# Patient Record
Sex: FEMALE | Race: WHITE | Employment: FULL TIME | URBAN - METROPOLITAN AREA
[De-identification: names, ages, dates, MRNs, and addresses within clinical notes are randomized per-mention and may not be internally consistent; named-entity substitution may affect disease eponyms.]

---

## 2017-05-22 ENCOUNTER — HOSPITAL ENCOUNTER (OUTPATIENT)
Dept: PAIN MANAGEMENT | Age: 32
Discharge: HOME | End: 2017-05-22
Payer: MEDICARE

## 2017-05-22 VITALS
BODY MASS INDEX: 37.56 KG/M2 | HEIGHT: 64 IN | HEART RATE: 83 BPM | TEMPERATURE: 98.5 F | WEIGHT: 220 LBS | RESPIRATION RATE: 16 BRPM | DIASTOLIC BLOOD PRESSURE: 79 MMHG | SYSTOLIC BLOOD PRESSURE: 117 MMHG

## 2017-05-22 DIAGNOSIS — M96.1 POSTLAMINECTOMY SYNDROME, UNSPECIFIED REGION: Chronic | ICD-10-CM

## 2017-05-22 DIAGNOSIS — M54.16 LUMBAR RADICULOPATHY: Chronic | ICD-10-CM

## 2017-05-22 PROCEDURE — 80307 DRUG TEST PRSMV CHEM ANLYZR: CPT

## 2017-05-22 PROCEDURE — 99204 OFFICE O/P NEW MOD 45 MIN: CPT

## 2017-05-22 RX ORDER — OXYCODONE HYDROCHLORIDE AND ACETAMINOPHEN 5; 325 MG/1; MG/1
1 TABLET ORAL 3 TIMES DAILY PRN
COMMUNITY
End: 2017-06-20

## 2017-05-22 RX ORDER — GABAPENTIN 600 MG/1
600 TABLET ORAL 3 TIMES DAILY
Status: ON HOLD | COMMUNITY
End: 2018-01-16 | Stop reason: HOSPADM

## 2017-05-22 RX ORDER — METHOCARBAMOL 750 MG/1
750 TABLET, FILM COATED ORAL 4 TIMES DAILY
COMMUNITY
End: 2017-07-18

## 2017-05-22 RX ORDER — DULOXETIN HYDROCHLORIDE 30 MG/1
30 CAPSULE, DELAYED RELEASE ORAL DAILY
Status: ON HOLD | COMMUNITY
End: 2018-01-16 | Stop reason: HOSPADM

## 2017-05-22 RX ORDER — OXYCODONE HYDROCHLORIDE 5 MG/1
5 TABLET ORAL EVERY 8 HOURS PRN
COMMUNITY
End: 2017-05-22 | Stop reason: SDUPTHER

## 2017-05-22 RX ORDER — IBUPROFEN 200 MG
200 TABLET ORAL EVERY 6 HOURS PRN
Status: ON HOLD | COMMUNITY
End: 2018-01-16 | Stop reason: HOSPADM

## 2017-05-22 RX ORDER — OXYCODONE HYDROCHLORIDE 5 MG/1
5 TABLET ORAL EVERY 8 HOURS PRN
Qty: 42 TABLET | Refills: 0 | Status: SHIPPED | OUTPATIENT
Start: 2017-06-06 | End: 2017-06-20 | Stop reason: SDUPTHER

## 2017-05-22 ASSESSMENT — PAIN DESCRIPTION - ORIENTATION: ORIENTATION: RIGHT

## 2017-05-22 ASSESSMENT — PAIN DESCRIPTION - DESCRIPTORS: DESCRIPTORS: CONSTANT;ACHING;STABBING;SHARP

## 2017-05-22 ASSESSMENT — PAIN DESCRIPTION - LOCATION: LOCATION: BACK;LEG;HIP

## 2017-05-22 ASSESSMENT — PAIN DESCRIPTION - PAIN TYPE: TYPE: CHRONIC PAIN

## 2017-05-22 ASSESSMENT — PAIN DESCRIPTION - FREQUENCY: FREQUENCY: CONTINUOUS

## 2017-05-22 ASSESSMENT — PAIN DESCRIPTION - ONSET: ONSET: ON-GOING

## 2017-05-22 ASSESSMENT — PAIN SCALES - GENERAL: PAINLEVEL_OUTOF10: 9

## 2017-05-22 ASSESSMENT — PAIN DESCRIPTION - PROGRESSION: CLINICAL_PROGRESSION: NOT CHANGED

## 2017-05-24 LAB
6-ACETYLMORPHINE, UR: NOT DETECTED
7-AMINOCLONAZEPAM, URINE: NOT DETECTED
ALPHA-OH-ALPRAZ, URINE: NOT DETECTED
ALPRAZOLAM, URINE: NOT DETECTED
AMPHETAMINES, URINE: NOT DETECTED
BARBITURATES, URINE: NOT DETECTED
BENZOYLECGONINE, UR: NOT DETECTED
BUPRENORPHINE URINE: NOT DETECTED
CARISOPRODOL, UR: NOT DETECTED
CLONAZEPAM, URINE: NOT DETECTED
CODEINE, URINE: NOT DETECTED
CREATININE URINE: 250.1 MG/DL (ref 20–400)
DIAZEPAM, URINE: PRESENT
EER PAIN MGT DRUG PANEL, HIGH RES/EMIT U: NORMAL
ETHYL GLUCURONIDE UR: NOT DETECTED
FENTANYL URINE: NOT DETECTED
HYDROCODONE, URINE: NOT DETECTED
HYDROMORPHONE, URINE: NOT DETECTED
LORAZEPAM, URINE: NOT DETECTED
MARIJUANA METAB, UR: NOT DETECTED
MDA, UR: NOT DETECTED
MDEA, EVE, UR: NOT DETECTED
MDMA URINE: NOT DETECTED
MEPERIDINE METAB, UR: NOT DETECTED
METHADONE, URINE: NOT DETECTED
METHAMPHETAMINE, URINE: NOT DETECTED
METHYLPHENIDATE: NOT DETECTED
MIDAZOLAM, URINE: NOT DETECTED
MORPHINE URINE: NOT DETECTED
NORBUPRENORPHINE, URINE: NOT DETECTED
NORDIAZEPAM, URINE: NOT DETECTED
NORFENTANYL, URINE: NOT DETECTED
NORHYDROCODONE, URINE: NOT DETECTED
NOROXYCODONE, URINE: NOT DETECTED
NOROXYMORPHONE, URINE: NOT DETECTED
OXAZEPAM, URINE: NOT DETECTED
OXYCODONE URINE: PRESENT
OXYMORPHONE, URINE: NOT DETECTED
PAIN MGT DRUG PANEL, HI RES, UR: NORMAL
PCP,URINE: NOT DETECTED
PHENTERMINE, UR: NOT DETECTED
PROPOXYPHENE, URINE: NOT DETECTED
TAPENTADOL, URINE: NOT DETECTED
TAPENTADOL-O-SULFATE, URINE: NOT DETECTED
TEMAZEPAM, URINE: NOT DETECTED
TRAMADOL, URINE: NOT DETECTED
ZOLPIDEM, URINE: NOT DETECTED

## 2017-06-20 ENCOUNTER — HOSPITAL ENCOUNTER (OUTPATIENT)
Dept: PAIN MANAGEMENT | Age: 32
Discharge: HOME | End: 2017-06-20
Payer: MEDICARE

## 2017-06-20 VITALS
HEART RATE: 83 BPM | DIASTOLIC BLOOD PRESSURE: 58 MMHG | RESPIRATION RATE: 12 BRPM | SYSTOLIC BLOOD PRESSURE: 135 MMHG | TEMPERATURE: 98.6 F

## 2017-06-20 DIAGNOSIS — M54.16 LUMBAR RADICULOPATHY: Primary | Chronic | ICD-10-CM

## 2017-06-20 DIAGNOSIS — M96.1 POSTLAMINECTOMY SYNDROME, UNSPECIFIED REGION: Chronic | ICD-10-CM

## 2017-06-20 PROCEDURE — 99214 OFFICE O/P EST MOD 30 MIN: CPT

## 2017-06-20 PROCEDURE — 99213 OFFICE O/P EST LOW 20 MIN: CPT

## 2017-06-20 RX ORDER — OXYCODONE HYDROCHLORIDE 5 MG/1
5 TABLET ORAL EVERY 8 HOURS PRN
Qty: 90 TABLET | Refills: 0 | Status: SHIPPED | OUTPATIENT
Start: 2017-06-20 | End: 2017-07-18 | Stop reason: SDUPTHER

## 2017-06-20 ASSESSMENT — PAIN DESCRIPTION - FREQUENCY: FREQUENCY: CONTINUOUS

## 2017-06-20 ASSESSMENT — PAIN DESCRIPTION - PROGRESSION: CLINICAL_PROGRESSION: NOT CHANGED

## 2017-06-20 ASSESSMENT — PAIN SCALES - GENERAL: PAINLEVEL_OUTOF10: 7

## 2017-06-20 ASSESSMENT — PAIN DESCRIPTION - LOCATION: LOCATION: BACK;LEG;HIP

## 2017-06-20 ASSESSMENT — ENCOUNTER SYMPTOMS
CONSTIPATION: 0
COUGH: 0
BACK PAIN: 1
SHORTNESS OF BREATH: 0

## 2017-06-20 ASSESSMENT — PAIN DESCRIPTION - PAIN TYPE: TYPE: CHRONIC PAIN

## 2017-06-20 ASSESSMENT — PAIN DESCRIPTION - ORIENTATION: ORIENTATION: MID;LOWER

## 2017-07-18 ENCOUNTER — HOSPITAL ENCOUNTER (OUTPATIENT)
Dept: PAIN MANAGEMENT | Age: 32
Discharge: HOME | End: 2017-07-18
Payer: MEDICARE

## 2017-07-18 VITALS — SYSTOLIC BLOOD PRESSURE: 129 MMHG | HEART RATE: 109 BPM | TEMPERATURE: 98.4 F | DIASTOLIC BLOOD PRESSURE: 91 MMHG

## 2017-07-18 DIAGNOSIS — M96.1 POSTLAMINECTOMY SYNDROME, UNSPECIFIED REGION: Primary | Chronic | ICD-10-CM

## 2017-07-18 DIAGNOSIS — M54.16 LUMBAR RADICULOPATHY: Chronic | ICD-10-CM

## 2017-07-18 PROCEDURE — 80307 DRUG TEST PRSMV CHEM ANLYZR: CPT

## 2017-07-18 PROCEDURE — 99215 OFFICE O/P EST HI 40 MIN: CPT

## 2017-07-18 RX ORDER — OXYCODONE HYDROCHLORIDE 5 MG/1
5 TABLET ORAL EVERY 8 HOURS PRN
Qty: 90 TABLET | Refills: 0 | Status: SHIPPED | OUTPATIENT
Start: 2017-07-20 | End: 2017-08-19

## 2017-07-18 ASSESSMENT — PAIN DESCRIPTION - FREQUENCY: FREQUENCY: CONTINUOUS

## 2017-07-18 ASSESSMENT — PAIN DESCRIPTION - ONSET: ONSET: ON-GOING

## 2017-07-18 ASSESSMENT — PAIN SCALES - GENERAL: PAINLEVEL_OUTOF10: 8

## 2017-07-18 ASSESSMENT — PAIN DESCRIPTION - PAIN TYPE: TYPE: CHRONIC PAIN

## 2017-07-18 ASSESSMENT — PAIN DESCRIPTION - DESCRIPTORS: DESCRIPTORS: ACHING;BURNING;CONSTANT

## 2017-07-18 ASSESSMENT — PAIN DESCRIPTION - PROGRESSION: CLINICAL_PROGRESSION: GRADUALLY WORSENING

## 2017-07-18 ASSESSMENT — PAIN DESCRIPTION - LOCATION: LOCATION: BACK;HIP;LEG

## 2017-07-18 ASSESSMENT — PAIN DESCRIPTION - ORIENTATION: ORIENTATION: LOWER;RIGHT;LEFT

## 2017-07-19 ASSESSMENT — ENCOUNTER SYMPTOMS
CONSTIPATION: 0
BACK PAIN: 1
COUGH: 0
SHORTNESS OF BREATH: 0
BOWEL INCONTINENCE: 0

## 2017-07-21 LAB

## 2018-01-14 ENCOUNTER — APPOINTMENT (OUTPATIENT)
Dept: GENERAL RADIOLOGY | Age: 33
DRG: 812 | End: 2018-01-14
Payer: MEDICARE

## 2018-01-14 ENCOUNTER — HOSPITAL ENCOUNTER (INPATIENT)
Age: 33
LOS: 2 days | Discharge: PSYCHIATRIC OR SUBSTANCE USE FACILITY | DRG: 812 | End: 2018-01-16
Attending: EMERGENCY MEDICINE | Admitting: INTERNAL MEDICINE
Payer: MEDICARE

## 2018-01-14 ENCOUNTER — APPOINTMENT (OUTPATIENT)
Dept: CT IMAGING | Age: 33
DRG: 812 | End: 2018-01-14
Payer: MEDICARE

## 2018-01-14 DIAGNOSIS — T50.902A INTENTIONAL DRUG OVERDOSE, INITIAL ENCOUNTER (HCC): Primary | ICD-10-CM

## 2018-01-14 DIAGNOSIS — T14.91XA SUICIDE ATTEMPT (HCC): ICD-10-CM

## 2018-01-14 PROBLEM — T50.912A SUICIDE ATTEMPT BY MULTIPLE DRUG OVERDOSE (HCC): Status: ACTIVE | Noted: 2018-01-14

## 2018-01-14 PROBLEM — J96.01 ACUTE RESPIRATORY FAILURE WITH HYPOXIA (HCC): Status: ACTIVE | Noted: 2018-01-14

## 2018-01-14 LAB
-: ABNORMAL
ABSOLUTE EOS #: 0 K/UL (ref 0–0.4)
ABSOLUTE IMMATURE GRANULOCYTE: ABNORMAL K/UL (ref 0–0.3)
ABSOLUTE LYMPH #: 1.3 K/UL (ref 1–4.8)
ABSOLUTE MONO #: 0.5 K/UL (ref 0.1–1.3)
ACETAMINOPHEN LEVEL: <10 UG/ML (ref 10–30)
ACETAMINOPHEN LEVEL: <10 UG/ML (ref 10–30)
ALBUMIN SERPL-MCNC: 4.2 G/DL (ref 3.5–5.2)
ALBUMIN/GLOBULIN RATIO: ABNORMAL (ref 1–2.5)
ALLEN TEST: ABNORMAL
ALLEN TEST: ABNORMAL
ALP BLD-CCNC: 77 U/L (ref 35–104)
ALT SERPL-CCNC: 13 U/L (ref 5–33)
AMORPHOUS: ABNORMAL
AMPHETAMINE SCREEN URINE: NEGATIVE
ANION GAP SERPL CALCULATED.3IONS-SCNC: 17 MMOL/L (ref 9–17)
AST SERPL-CCNC: 18 U/L
BACTERIA: ABNORMAL
BARBITURATE SCREEN URINE: NEGATIVE
BASOPHILS # BLD: 1 % (ref 0–2)
BASOPHILS ABSOLUTE: 0.1 K/UL (ref 0–0.2)
BENZODIAZEPINE SCREEN, URINE: NEGATIVE
BILIRUB SERPL-MCNC: <0.15 MG/DL (ref 0.3–1.2)
BILIRUBIN URINE: NEGATIVE
BUN BLDV-MCNC: 12 MG/DL (ref 6–20)
BUN/CREAT BLD: ABNORMAL (ref 9–20)
BUPRENORPHINE URINE: ABNORMAL
CALCIUM SERPL-MCNC: 8.4 MG/DL (ref 8.6–10.4)
CANNABINOID SCREEN URINE: POSITIVE
CARBOXYHEMOGLOBIN: 1.7 % (ref 0–5)
CARBOXYHEMOGLOBIN: 3 % (ref 0–5)
CASTS UA: ABNORMAL /LPF
CHLORIDE BLD-SCNC: 104 MMOL/L (ref 98–107)
CO2: 21 MMOL/L (ref 20–31)
COCAINE METABOLITE, URINE: POSITIVE
COLOR: YELLOW
COMMENT UA: ABNORMAL
CREAT SERPL-MCNC: 0.66 MG/DL (ref 0.5–0.9)
CRYSTALS, UA: ABNORMAL /HPF
DIFFERENTIAL TYPE: ABNORMAL
EKG ATRIAL RATE: 113 BPM
EKG P AXIS: 76 DEGREES
EKG P-R INTERVAL: 142 MS
EKG Q-T INTERVAL: 348 MS
EKG QRS DURATION: 92 MS
EKG QTC CALCULATION (BAZETT): 477 MS
EKG R AXIS: 49 DEGREES
EKG T AXIS: 56 DEGREES
EKG VENTRICULAR RATE: 113 BPM
EOSINOPHILS RELATIVE PERCENT: 0 % (ref 0–4)
EPITHELIAL CELLS UA: ABNORMAL /HPF
ETHANOL PERCENT: <0.01 %
ETHANOL: <10 MG/DL
FIO2: ABNORMAL
FIO2: ABNORMAL
GFR AFRICAN AMERICAN: >60 ML/MIN
GFR NON-AFRICAN AMERICAN: >60 ML/MIN
GFR SERPL CREATININE-BSD FRML MDRD: ABNORMAL ML/MIN/{1.73_M2}
GFR SERPL CREATININE-BSD FRML MDRD: ABNORMAL ML/MIN/{1.73_M2}
GLUCOSE BLD-MCNC: 143 MG/DL (ref 70–99)
GLUCOSE URINE: NEGATIVE
HCG(URINE) PREGNANCY TEST: NEGATIVE
HCO3 ARTERIAL: 22.7 MMOL/L (ref 22–26)
HCO3 VENOUS: 23 MMOL/L (ref 24–30)
HCT VFR BLD CALC: 42.7 % (ref 36–46)
HEMOGLOBIN: 14.3 G/DL (ref 12–16)
IMMATURE GRANULOCYTES: ABNORMAL %
KETONES, URINE: NEGATIVE
LACTIC ACID: 1.8 MMOL/L (ref 0.5–2.2)
LACTIC ACID: 3.4 MMOL/L (ref 0.5–2.2)
LEUKOCYTE ESTERASE, URINE: NEGATIVE
LIPASE: 17 U/L (ref 13–60)
LYMPHOCYTES # BLD: 12 % (ref 24–44)
MCH RBC QN AUTO: 31.4 PG (ref 26–34)
MCHC RBC AUTO-ENTMCNC: 33.4 G/DL (ref 31–37)
MCV RBC AUTO: 94 FL (ref 80–100)
MDMA URINE: ABNORMAL
METHADONE SCREEN, URINE: NEGATIVE
METHAMPHETAMINE, URINE: ABNORMAL
METHEMOGLOBIN: 0.4 % (ref 0–1.9)
METHEMOGLOBIN: 0.6 % (ref 0–1.9)
MODE: ABNORMAL
MODE: ABNORMAL
MONOCYTES # BLD: 4 % (ref 1–7)
MUCUS: ABNORMAL
NEGATIVE BASE EXCESS, ART: 2.6 MMOL/L (ref 0–2)
NEGATIVE BASE EXCESS, VEN: 3 MMOL/L (ref 0–2)
NITRITE, URINE: NEGATIVE
NOTIFICATION TIME: ABNORMAL
NOTIFICATION TIME: ABNORMAL
NOTIFICATION: ABNORMAL
NOTIFICATION: ABNORMAL
O2 DEVICE/FLOW/%: ABNORMAL
O2 DEVICE/FLOW/%: ABNORMAL
O2 SAT, ARTERIAL: 97.8 % (ref 95–98)
O2 SAT, VEN: 85.1 % (ref 60–85)
OPIATES, URINE: POSITIVE
OTHER OBSERVATIONS UA: ABNORMAL
OXYCODONE SCREEN URINE: NEGATIVE
OXYHEMOGLOBIN: ABNORMAL % (ref 95–98)
OXYHEMOGLOBIN: ABNORMAL % (ref 95–98)
PATIENT TEMP: 37
PATIENT TEMP: 37
PCO2 ARTERIAL: 39.4 MMHG (ref 35–45)
PCO2, ART, TEMP ADJ: ABNORMAL (ref 35–45)
PCO2, VEN, TEMP ADJ: ABNORMAL MMHG (ref 39–55)
PCO2, VEN: 44 (ref 39–55)
PDW BLD-RTO: 13.3 % (ref 11.5–14.9)
PEEP/CPAP: 5
PEEP/CPAP: ABNORMAL
PH ARTERIAL: 7.37 (ref 7.35–7.45)
PH UA: 6 (ref 5–8)
PH VENOUS: 7.33 (ref 7.32–7.42)
PH, ART, TEMP ADJ: ABNORMAL (ref 7.35–7.45)
PH, VEN, TEMP ADJ: ABNORMAL (ref 7.32–7.42)
PHENCYCLIDINE, URINE: NEGATIVE
PLATELET # BLD: 277 K/UL (ref 150–450)
PLATELET ESTIMATE: ABNORMAL
PMV BLD AUTO: 8.3 FL (ref 6–12)
PO2 ARTERIAL: 201 MMHG (ref 80–100)
PO2, ART, TEMP ADJ: ABNORMAL MMHG (ref 80–100)
PO2, VEN, TEMP ADJ: ABNORMAL MMHG (ref 30–50)
PO2, VEN: 54.6 (ref 30–50)
POSITIVE BASE EXCESS, ART: ABNORMAL MMOL/L (ref 0–2)
POSITIVE BASE EXCESS, VEN: ABNORMAL MMOL/L (ref 0–2)
POTASSIUM SERPL-SCNC: 3.7 MMOL/L (ref 3.7–5.3)
PROPOXYPHENE, URINE: ABNORMAL
PROTEIN UA: NEGATIVE
PSV: ABNORMAL
PSV: ABNORMAL
PT. POSITION: ABNORMAL
PT. POSITION: ABNORMAL
RBC # BLD: 4.54 M/UL (ref 4–5.2)
RBC # BLD: ABNORMAL 10*6/UL
RBC UA: ABNORMAL /HPF
RENAL EPITHELIAL, UA: ABNORMAL /HPF
RESPIRATORY RATE: 16
RESPIRATORY RATE: ABNORMAL
SALICYLATE LEVEL: <1 MG/DL (ref 3–10)
SALICYLATE LEVEL: <1 MG/DL (ref 3–10)
SAMPLE SITE: ABNORMAL
SAMPLE SITE: ABNORMAL
SEG NEUTROPHILS: 83 % (ref 36–66)
SEGMENTED NEUTROPHILS ABSOLUTE COUNT: 9.4 K/UL (ref 1.3–9.1)
SET RATE: 16
SET RATE: ABNORMAL
SODIUM BLD-SCNC: 142 MMOL/L (ref 135–144)
SPECIFIC GRAVITY UA: 1.02 (ref 1–1.03)
TEST INFORMATION: ABNORMAL
TEXT FOR RESPIRATORY: ABNORMAL
TEXT FOR RESPIRATORY: ABNORMAL
TOTAL CK: 85 U/L (ref 26–192)
TOTAL HB: ABNORMAL G/DL (ref 12–16)
TOTAL HB: ABNORMAL G/DL (ref 12–16)
TOTAL PROTEIN: 7.4 G/DL (ref 6.4–8.3)
TOTAL RATE: 23
TOTAL RATE: ABNORMAL
TRICHOMONAS: ABNORMAL
TRICYCLIC ANTIDEPRESSANTS, UR: ABNORMAL
TROPONIN INTERP: NORMAL
TROPONIN T: <0.03 NG/ML
TURBIDITY: CLEAR
URINE HGB: ABNORMAL
UROBILINOGEN, URINE: NORMAL
VT: 450
VT: ABNORMAL
WBC # BLD: 11.3 K/UL (ref 3.5–11)
WBC # BLD: ABNORMAL 10*3/UL
WBC UA: ABNORMAL /HPF
YEAST: ABNORMAL

## 2018-01-14 PROCEDURE — 94770 HC ETCO2 MONITOR DAILY: CPT

## 2018-01-14 PROCEDURE — 51703 INSERT BLADDER CATH COMPLEX: CPT

## 2018-01-14 PROCEDURE — 82800 BLOOD PH: CPT

## 2018-01-14 PROCEDURE — 36415 COLL VENOUS BLD VENIPUNCTURE: CPT

## 2018-01-14 PROCEDURE — 84703 CHORIONIC GONADOTROPIN ASSAY: CPT

## 2018-01-14 PROCEDURE — 2580000003 HC RX 258: Performed by: INTERNAL MEDICINE

## 2018-01-14 PROCEDURE — 80307 DRUG TEST PRSMV CHEM ANLYZR: CPT

## 2018-01-14 PROCEDURE — 0BH17EZ INSERTION OF ENDOTRACHEAL AIRWAY INTO TRACHEA, VIA NATURAL OR ARTIFICIAL OPENING: ICD-10-PCS | Performed by: INTERNAL MEDICINE

## 2018-01-14 PROCEDURE — 5A1935Z RESPIRATORY VENTILATION, LESS THAN 24 CONSECUTIVE HOURS: ICD-10-PCS | Performed by: INTERNAL MEDICINE

## 2018-01-14 PROCEDURE — 80053 COMPREHEN METABOLIC PANEL: CPT

## 2018-01-14 PROCEDURE — 94762 N-INVAS EAR/PLS OXIMTRY CONT: CPT

## 2018-01-14 PROCEDURE — 82550 ASSAY OF CK (CPK): CPT

## 2018-01-14 PROCEDURE — 83605 ASSAY OF LACTIC ACID: CPT

## 2018-01-14 PROCEDURE — 31500 INSERT EMERGENCY AIRWAY: CPT

## 2018-01-14 PROCEDURE — 6360000002 HC RX W HCPCS

## 2018-01-14 PROCEDURE — 2580000003 HC RX 258: Performed by: FAMILY MEDICINE

## 2018-01-14 PROCEDURE — 94002 VENT MGMT INPAT INIT DAY: CPT

## 2018-01-14 PROCEDURE — 2000000000 HC ICU R&B

## 2018-01-14 PROCEDURE — 6360000002 HC RX W HCPCS: Performed by: INTERNAL MEDICINE

## 2018-01-14 PROCEDURE — 82805 BLOOD GASES W/O2 SATURATION: CPT

## 2018-01-14 PROCEDURE — 36600 WITHDRAWAL OF ARTERIAL BLOOD: CPT

## 2018-01-14 PROCEDURE — 83690 ASSAY OF LIPASE: CPT

## 2018-01-14 PROCEDURE — 43753 TX GASTRO INTUB W/ASP: CPT

## 2018-01-14 PROCEDURE — 99285 EMERGENCY DEPT VISIT HI MDM: CPT

## 2018-01-14 PROCEDURE — 71045 X-RAY EXAM CHEST 1 VIEW: CPT

## 2018-01-14 PROCEDURE — C9113 INJ PANTOPRAZOLE SODIUM, VIA: HCPCS | Performed by: INTERNAL MEDICINE

## 2018-01-14 PROCEDURE — 81001 URINALYSIS AUTO W/SCOPE: CPT

## 2018-01-14 PROCEDURE — 2580000003 HC RX 258: Performed by: EMERGENCY MEDICINE

## 2018-01-14 PROCEDURE — 6360000002 HC RX W HCPCS: Performed by: EMERGENCY MEDICINE

## 2018-01-14 PROCEDURE — 99291 CRITICAL CARE FIRST HOUR: CPT | Performed by: INTERNAL MEDICINE

## 2018-01-14 PROCEDURE — 70450 CT HEAD/BRAIN W/O DYE: CPT

## 2018-01-14 PROCEDURE — 2500000003 HC RX 250 WO HCPCS: Performed by: EMERGENCY MEDICINE

## 2018-01-14 PROCEDURE — 85025 COMPLETE CBC W/AUTO DIFF WBC: CPT

## 2018-01-14 PROCEDURE — G0480 DRUG TEST DEF 1-7 CLASSES: HCPCS

## 2018-01-14 PROCEDURE — 2500000003 HC RX 250 WO HCPCS

## 2018-01-14 PROCEDURE — 84484 ASSAY OF TROPONIN QUANT: CPT

## 2018-01-14 PROCEDURE — 6370000000 HC RX 637 (ALT 250 FOR IP): Performed by: INTERNAL MEDICINE

## 2018-01-14 RX ORDER — ETOMIDATE 2 MG/ML
INJECTION INTRAVENOUS DAILY PRN
Status: COMPLETED | OUTPATIENT
Start: 2018-01-14 | End: 2018-01-14

## 2018-01-14 RX ORDER — 0.9 % SODIUM CHLORIDE 0.9 %
1000 INTRAVENOUS SOLUTION INTRAVENOUS ONCE
Status: COMPLETED | OUTPATIENT
Start: 2018-01-14 | End: 2018-01-14

## 2018-01-14 RX ORDER — DEXTROSE AND SODIUM CHLORIDE 5; .45 G/100ML; G/100ML
INJECTION, SOLUTION INTRAVENOUS CONTINUOUS
Status: DISCONTINUED | OUTPATIENT
Start: 2018-01-14 | End: 2018-01-15

## 2018-01-14 RX ORDER — PROPOFOL 10 MG/ML
INJECTION, EMULSION INTRAVENOUS CONTINUOUS PRN
Status: COMPLETED | OUTPATIENT
Start: 2018-01-14 | End: 2018-01-14

## 2018-01-14 RX ORDER — CYCLOBENZAPRINE HCL 10 MG
10 TABLET ORAL
COMMUNITY
End: 2020-04-01

## 2018-01-14 RX ORDER — MIDAZOLAM HYDROCHLORIDE 1 MG/ML
2 INJECTION INTRAMUSCULAR; INTRAVENOUS
Status: DISCONTINUED | OUTPATIENT
Start: 2018-01-14 | End: 2018-01-15

## 2018-01-14 RX ORDER — PANTOPRAZOLE SODIUM 40 MG/10ML
40 INJECTION, POWDER, LYOPHILIZED, FOR SOLUTION INTRAVENOUS DAILY
Status: DISCONTINUED | OUTPATIENT
Start: 2018-01-14 | End: 2018-01-15

## 2018-01-14 RX ORDER — LORAZEPAM 2 MG/ML
2 INJECTION INTRAMUSCULAR ONCE
Status: COMPLETED | OUTPATIENT
Start: 2018-01-14 | End: 2018-01-14

## 2018-01-14 RX ORDER — ACETAMINOPHEN 325 MG/1
650 TABLET ORAL EVERY 4 HOURS PRN
Status: DISCONTINUED | OUTPATIENT
Start: 2018-01-14 | End: 2018-01-16 | Stop reason: HOSPADM

## 2018-01-14 RX ORDER — NICOTINE 21 MG/24HR
1 PATCH, TRANSDERMAL 24 HOURS TRANSDERMAL DAILY
Status: DISCONTINUED | OUTPATIENT
Start: 2018-01-14 | End: 2018-01-16 | Stop reason: HOSPADM

## 2018-01-14 RX ORDER — 0.9 % SODIUM CHLORIDE 0.9 %
10 VIAL (ML) INJECTION DAILY
Status: DISCONTINUED | OUTPATIENT
Start: 2018-01-14 | End: 2018-01-15

## 2018-01-14 RX ORDER — CHLORHEXIDINE GLUCONATE 0.12 MG/ML
15 RINSE ORAL 2 TIMES DAILY
Status: DISCONTINUED | OUTPATIENT
Start: 2018-01-14 | End: 2018-01-15

## 2018-01-14 RX ORDER — MIDAZOLAM HYDROCHLORIDE 1 MG/ML
1 INJECTION INTRAMUSCULAR; INTRAVENOUS
Status: DISCONTINUED | OUTPATIENT
Start: 2018-01-14 | End: 2018-01-15

## 2018-01-14 RX ORDER — SODIUM CHLORIDE 0.9 % (FLUSH) 0.9 %
10 SYRINGE (ML) INJECTION EVERY 12 HOURS SCHEDULED
Status: DISCONTINUED | OUTPATIENT
Start: 2018-01-14 | End: 2018-01-16 | Stop reason: HOSPADM

## 2018-01-14 RX ORDER — PROPOFOL 10 MG/ML
20 INJECTION, EMULSION INTRAVENOUS ONCE
Status: COMPLETED | OUTPATIENT
Start: 2018-01-14 | End: 2018-01-14

## 2018-01-14 RX ORDER — SODIUM CHLORIDE 9 MG/ML
INJECTION, SOLUTION INTRAVENOUS CONTINUOUS
Status: DISCONTINUED | OUTPATIENT
Start: 2018-01-14 | End: 2018-01-15

## 2018-01-14 RX ORDER — SUCCINYLCHOLINE CHLORIDE 20 MG/ML
INJECTION INTRAMUSCULAR; INTRAVENOUS DAILY PRN
Status: COMPLETED | OUTPATIENT
Start: 2018-01-14 | End: 2018-01-14

## 2018-01-14 RX ORDER — SODIUM CHLORIDE 0.9 % (FLUSH) 0.9 %
10 SYRINGE (ML) INJECTION PRN
Status: DISCONTINUED | OUTPATIENT
Start: 2018-01-14 | End: 2018-01-16 | Stop reason: HOSPADM

## 2018-01-14 RX ORDER — PROPOFOL 10 MG/ML
10 INJECTION, EMULSION INTRAVENOUS CONTINUOUS
Status: DISCONTINUED | OUTPATIENT
Start: 2018-01-14 | End: 2018-01-15

## 2018-01-14 RX ORDER — ALBUTEROL SULFATE 90 UG/1
4 AEROSOL, METERED RESPIRATORY (INHALATION) EVERY 6 HOURS PRN
Status: DISCONTINUED | OUTPATIENT
Start: 2018-01-14 | End: 2018-01-16 | Stop reason: HOSPADM

## 2018-01-14 RX ORDER — FENTANYL CITRATE 50 UG/ML
25 INJECTION, SOLUTION INTRAMUSCULAR; INTRAVENOUS
Status: DISCONTINUED | OUTPATIENT
Start: 2018-01-14 | End: 2018-01-15

## 2018-01-14 RX ADMIN — MIDAZOLAM 2 MG: 1 INJECTION INTRAMUSCULAR; INTRAVENOUS at 22:09

## 2018-01-14 RX ADMIN — LORAZEPAM 2 MG: 2 INJECTION INTRAMUSCULAR; INTRAVENOUS at 05:13

## 2018-01-14 RX ADMIN — SODIUM CHLORIDE: 9 INJECTION, SOLUTION INTRAVENOUS at 12:49

## 2018-01-14 RX ADMIN — PROPOFOL 50 MCG/KG/MIN: 10 INJECTION, EMULSION INTRAVENOUS at 06:18

## 2018-01-14 RX ADMIN — DEXTROSE AND SODIUM CHLORIDE: 5; 450 INJECTION, SOLUTION INTRAVENOUS at 06:38

## 2018-01-14 RX ADMIN — PROPOFOL 70 MCG/KG/MIN: 10 INJECTION, EMULSION INTRAVENOUS at 23:38

## 2018-01-14 RX ADMIN — Medication 120 MG: at 03:09

## 2018-01-14 RX ADMIN — MIDAZOLAM 2 MG: 1 INJECTION INTRAMUSCULAR; INTRAVENOUS at 16:03

## 2018-01-14 RX ADMIN — PROPOFOL 50 MCG/KG/MIN: 10 INJECTION, EMULSION INTRAVENOUS at 09:28

## 2018-01-14 RX ADMIN — PROPOFOL 50 MCG/KG/MIN: 10 INJECTION, EMULSION INTRAVENOUS at 12:04

## 2018-01-14 RX ADMIN — SODIUM CHLORIDE 1000 ML: 9 INJECTION, SOLUTION INTRAVENOUS at 03:07

## 2018-01-14 RX ADMIN — PROPOFOL 50 MCG/KG/MIN: 10 INJECTION, EMULSION INTRAVENOUS at 16:03

## 2018-01-14 RX ADMIN — PROPOFOL 70 MCG/KG/MIN: 10 INJECTION, EMULSION INTRAVENOUS at 18:43

## 2018-01-14 RX ADMIN — PROPOFOL 70 MCG/KG/MIN: 10 INJECTION, EMULSION INTRAVENOUS at 21:12

## 2018-01-14 RX ADMIN — ENOXAPARIN SODIUM 40 MG: 40 INJECTION SUBCUTANEOUS at 08:54

## 2018-01-14 RX ADMIN — Medication 15 ML: at 21:13

## 2018-01-14 RX ADMIN — PROPOFOL 50 MG: 10 INJECTION, EMULSION INTRAVENOUS at 03:16

## 2018-01-14 RX ADMIN — PROPOFOL 150 MG: 10 INJECTION, EMULSION INTRAVENOUS at 03:15

## 2018-01-14 RX ADMIN — SODIUM CHLORIDE 10 ML: 9 INJECTION INTRAMUSCULAR; INTRAVENOUS; SUBCUTANEOUS at 08:54

## 2018-01-14 RX ADMIN — FENTANYL CITRATE 25 MCG: 50 INJECTION, SOLUTION INTRAMUSCULAR; INTRAVENOUS at 17:16

## 2018-01-14 RX ADMIN — SODIUM CHLORIDE: 9 INJECTION, SOLUTION INTRAVENOUS at 22:29

## 2018-01-14 RX ADMIN — MIDAZOLAM 2 MG: 1 INJECTION INTRAMUSCULAR; INTRAVENOUS at 17:10

## 2018-01-14 RX ADMIN — MIDAZOLAM 2 MG: 1 INJECTION INTRAMUSCULAR; INTRAVENOUS at 09:28

## 2018-01-14 RX ADMIN — PROPOFOL 20 MCG/KG/MIN: 10 INJECTION, EMULSION INTRAVENOUS at 03:35

## 2018-01-14 RX ADMIN — Medication 15 ML: at 09:01

## 2018-01-14 RX ADMIN — ETOMIDATE 30 MG: 2 INJECTION, SOLUTION INTRAVENOUS at 03:08

## 2018-01-14 RX ADMIN — PANTOPRAZOLE SODIUM 40 MG: 40 INJECTION, POWDER, FOR SOLUTION INTRAVENOUS at 08:54

## 2018-01-14 RX ADMIN — Medication 10 ML: at 21:13

## 2018-01-14 ASSESSMENT — PULMONARY FUNCTION TESTS
PIF_VALUE: 11
PIF_VALUE: 14
PIF_VALUE: 13
PIF_VALUE: 11
PIF_VALUE: 14
PIF_VALUE: 15
PIF_VALUE: 12
PIF_VALUE: 11
PIF_VALUE: 10
PIF_VALUE: 15
PIF_VALUE: 12
PIF_VALUE: 15
PIF_VALUE: 11
PIF_VALUE: 12
PIF_VALUE: 12
PIF_VALUE: 9
PIF_VALUE: 16
PIF_VALUE: 14
PIF_VALUE: 14
PIF_VALUE: 9
PIF_VALUE: 12
PIF_VALUE: 12
PIF_VALUE: 13
PIF_VALUE: 14
PIF_VALUE: 10
PIF_VALUE: 13
PIF_VALUE: 4
PIF_VALUE: 15
PIF_VALUE: 7
PIF_VALUE: 10
PIF_VALUE: 15
PIF_VALUE: 13
PIF_VALUE: 27
PIF_VALUE: 15
PIF_VALUE: 14
PIF_VALUE: 14
PIF_VALUE: 15
PIF_VALUE: 13
PIF_VALUE: 13
PIF_VALUE: 10
PIF_VALUE: 16
PIF_VALUE: 12
PIF_VALUE: 15

## 2018-01-14 ASSESSMENT — PAIN SCALES - GENERAL
PAINLEVEL_OUTOF10: 3
PAINLEVEL_OUTOF10: 0
PAINLEVEL_OUTOF10: 8

## 2018-01-14 ASSESSMENT — PAIN DESCRIPTION - PAIN TYPE: TYPE: ACUTE PAIN

## 2018-01-14 NOTE — ED PROVIDER NOTES
Placement assessment:     ETT to lip:  23    Tube secured with:  ETT hodge    Breath sounds:  Equal    Placement verification: chest rise      CXR findings:  ETT in proper place  Post-procedure details:     Patient tolerance of procedure: Tolerated well, no immediate complications    Critical Care  Performed by: Karla Jacobo  Authorized by: Karla Jacobo     Critical care provider statement:     Critical care time (minutes):  30    Critical care start time:  1/14/2018 3:45 AM    Critical care end time:  1/14/2018 4:38 AM    Critical care was necessary to treat or prevent imminent or life-threatening deterioration of the following conditions:  Respiratory failure    Critical care was time spent personally by me on the following activities:  Evaluation of patient's response to treatment, examination of patient and discussions with consultants      4:07 AM  Patient remains sedate. 4:09 AM  Medications provided. Patient was taking gabapentin 600 mg tablets, and Flexeril 10 mg tablets. 79 Gabapentin in bottle, 90 were prescribed. Possible ingestion of 11 tablets  11 flexeril tablets left, possibly 49-79 taken. 4:34 AM  Spoke with [place] from [employer]. He states continuous supportive care. No other interventions needed at this time. Pink slip has been filled out and is in chart. 4:57 AM  Spoke with Dr. Guadalupe Menendez and with the intensivist.      DIAGNOSTIC RESULTS   EKG: All EKG's are interpreted by the Emergency Department Physician who either signs or Co-signs this chart in the absence of a cardiologist.    Sinus tachycardia at 113. .  About 142, QS duration 92, . No ischemic changes noted. RADIOLOGY:All plain film, CT, MRI, and formal ultrasound images (except ED bedside ultrasound) are read by the radiologist and interpretations are viewed by the emergency physician. CT HEAD WO CONTRAST   Final Result   No acute intracranial abnormality.          XR CHEST PORTABLE   Final Result Satisfactory endotracheal tube located 2.9 cm above the alisson      Nasogastric tube traverses the diaphragm         XR CHEST (SINGLE VIEW FRONTAL)   Final Result   Endotracheal tube located 6 mm above the alisson. Suggest retraction by 2 cm      Nasogastric tube tip in the midesophagus. Suggest advancement by 20 cm           LABS: All lab results were reviewed by myself, and all abnormals are listed below.   Labs Reviewed   CBC WITH AUTO DIFFERENTIAL - Abnormal; Notable for the following:        Result Value    WBC 11.3 (*)     Seg Neutrophils 83 (*)     Lymphocytes 12 (*)     Segs Absolute 9.40 (*)     All other components within normal limits   COMPREHENSIVE METABOLIC PANEL W/ REFLEX TO MG FOR LOW K - Abnormal; Notable for the following:     Glucose 143 (*)     Calcium 8.4 (*)     Total Bilirubin <0.15 (*)     All other components within normal limits   SALICYLATE LEVEL - Abnormal; Notable for the following:     Salicylate Lvl <1 (*)     All other components within normal limits   URINE DRUG SCREEN - Abnormal; Notable for the following:     Cocaine Metabolite, Urine POSITIVE (*)     Opiates, Urine POSITIVE (*)     Cannabinoid Scrn, Ur POSITIVE (*)     All other components within normal limits   BLOOD GAS, VENOUS - Abnormal; Notable for the following:     pO2, Mitchell 54.6 (*)     HCO3, Venous 23.0 (*)     Negative Base Excess, Mitchell 3.0 (*)     O2 Sat, Mitchell 85.1 (*)     All other components within normal limits   ACETAMINOPHEN LEVEL - Abnormal; Notable for the following:     Acetaminophen Level <10 (*)     All other components within normal limits   BLOOD GAS, ARTERIAL - Abnormal; Notable for the following:     pO2, Arterial 201.0 (*)     Negative Base Excess, Art 2.6 (*)     All other components within normal limits   LACTIC ACID - Abnormal; Notable for the following:     Lactic Acid 3.4 (*)     All other components within normal limits   URINALYSIS - Abnormal; Notable for the following:     Urine Hgb LARGE (*) 1000 MG/100ML injection    DISCONTD: fentaNYL (SUBLIMAZE) injection 25 mcg    dextrose 5 % and 0.45 % sodium chloride infusion    DISCONTD: midazolam (VERSED) injection 1 mg    DISCONTD: midazolam (VERSED) injection 2 mg    nicotine (NICODERM CQ) 21 MG/24HR 1 patch    0.9 % sodium chloride infusion    albuterol sulfate  (90 Base) MCG/ACT inhaler 4 puff     Vent patient     CONSULTS:  IP CONSULT TO INTERNAL MEDICINE  IP CONSULT TO PULMONOLOGY  IP CONSULT TO PULMONOLOGY  IP CONSULT TO PSYCHIATRY    FINAL IMPRESSION      1. Intentional drug overdose, initial encounter ([facility] [address]    2. Suicide attempt          DISPOSITION/PLAN   DISPOSITION Admitted 01/14/2018 04:57:09 AM      PATIENT REFERRED TO:  No follow-up provider specified.   DISCHARGE MEDICATIONS:  Current Discharge Medication List        Janet Monae MD  Attending Emergency Physician                     Janet Monae MD  01/15/18 7452

## 2018-01-14 NOTE — PROGRESS NOTES
Patient thrashing wildly in the bed, kicking legs in the air and attempting to sit up. Pt makes no attempt to follow commands or open eyes. Sedation given.

## 2018-01-14 NOTE — PLAN OF CARE
Problem: Nutrition  Goal: Optimal nutrition therapy  Outcome: Ongoing  Nutrition Problem: Inadequate oral intake  Intervention: Food and/or Nutrient Delivery: Continue NPO  Nutritional Goals: Meet greater than 85% of estimated nutrition needs

## 2018-01-14 NOTE — H&P
mg by mouth daily    Historical Provider, MD   ibuprofen (ADVIL;MOTRIN) 200 MG tablet Take 200 mg by mouth every 6 hours as needed for Pain    Historical Provider, MD        Allergies:     Review of patient's allergies indicates no known allergies. Social History:     Tobacco:    reports that she has been smoking Cigarettes. She has been smoking about 1.00 pack per day. She does not have any smokeless tobacco history on file. Alcohol:      reports that she does not drink alcohol. Drug Use:  reports that she uses drugs, including Cocaine and Marijuana. Family History:     No family history on file. Review of Systems:     Positive and Negative as described in HPI. Review of Systems   Unable to perform ROS: Intubated         Physical Exam:   /66   Pulse 102   Temp 98.6 °F (37 °C) (Axillary)   Resp 20   Ht 5' 5\" (1.651 m)   Wt 215 lb 6.2 oz (97.7 kg)   LMP  (LMP Unknown)   SpO2 100%   BMI 35.84 kg/m²   Temp (24hrs), Av.3 °F (36.8 °C), Min:98.2 °F (36.8 °C), Max:98.6 °F (37 °C)    No results for input(s): POCGLU in the last 72 hours. Intake/Output Summary (Last 24 hours) at 18 0746  Last data filed at 18 0719   Gross per 24 hour   Intake                0 ml   Output              250 ml   Net             -250 ml       Physical Exam   Constitutional:   Intubated, somnolent   Eyes: Pupils are equal, round, and reactive to light. Pupils [place]   Cardiovascular: Regular rhythm and normal heart sounds. Tachycardia present. Pulmonary/Chest: Breath sounds normal. She is in respiratory distress. She has no wheezes. She has no rales. Intubated   Abdominal: Soft. Bowel sounds are normal. She exhibits no distension. There is no guarding.    Neurological:   Somnolent         Investigations:      Laboratory Testing:  Recent Results (from the past 24 hour(s))   Blood Gas, Venous    Collection Time: 18  3:15 AM   Result Value Ref Range    pH, Mitchell 7.327 7.320 - 7.420    pCO2,

## 2018-01-14 NOTE — PROGRESS NOTES
Meet greater than 85% of estimated nutrition needs    · Monitoring: NPO Status, Weight, Diet Progression, Pertinent Labs, Diet Tolerance    See Adult Nutrition Doc Flowsheet for more detail.      Leticia MARTINEZ, R.ALEJO, L.D,  Clinical Dietitian  Pager # [phone]

## 2018-01-14 NOTE — PROGRESS NOTES
Patient intubated with 7.5 ETT orally to 22 cm chato, bilateral BS, placed on vent PRVC 16, , PEEP 5, FIO2 40%, vbg drawn and resulted to Dr Evy Richmond.

## 2018-01-15 LAB
ABSOLUTE EOS #: 0.2 K/UL (ref 0–0.4)
ABSOLUTE IMMATURE GRANULOCYTE: ABNORMAL K/UL (ref 0–0.3)
ABSOLUTE LYMPH #: 1.3 K/UL (ref 1–4.8)
ABSOLUTE MONO #: 0.6 K/UL (ref 0.1–1.3)
ANION GAP SERPL CALCULATED.3IONS-SCNC: 10 MMOL/L (ref 9–17)
BASOPHILS # BLD: 1 % (ref 0–2)
BASOPHILS ABSOLUTE: 0 K/UL (ref 0–0.2)
BUN BLDV-MCNC: 11 MG/DL (ref 6–20)
BUN/CREAT BLD: ABNORMAL (ref 9–20)
CALCIUM SERPL-MCNC: 8.1 MG/DL (ref 8.6–10.4)
CHLORIDE BLD-SCNC: 107 MMOL/L (ref 98–107)
CO2: 25 MMOL/L (ref 20–31)
CREAT SERPL-MCNC: 0.67 MG/DL (ref 0.5–0.9)
DIFFERENTIAL TYPE: ABNORMAL
EOSINOPHILS RELATIVE PERCENT: 2 % (ref 0–4)
GFR AFRICAN AMERICAN: >60 ML/MIN
GFR NON-AFRICAN AMERICAN: >60 ML/MIN
GFR SERPL CREATININE-BSD FRML MDRD: ABNORMAL ML/MIN/{1.73_M2}
GFR SERPL CREATININE-BSD FRML MDRD: ABNORMAL ML/MIN/{1.73_M2}
GLUCOSE BLD-MCNC: 80 MG/DL (ref 65–105)
GLUCOSE BLD-MCNC: 85 MG/DL (ref 70–99)
HCT VFR BLD CALC: 35.4 % (ref 36–46)
HEMOGLOBIN: 12 G/DL (ref 12–16)
IMMATURE GRANULOCYTES: ABNORMAL %
LYMPHOCYTES # BLD: 16 % (ref 24–44)
MAGNESIUM: 2.1 MG/DL (ref 1.6–2.6)
MCH RBC QN AUTO: 31.7 PG (ref 26–34)
MCHC RBC AUTO-ENTMCNC: 33.9 G/DL (ref 31–37)
MCV RBC AUTO: 93.4 FL (ref 80–100)
MONOCYTES # BLD: 7 % (ref 1–7)
PDW BLD-RTO: 13.8 % (ref 11.5–14.9)
PLATELET # BLD: 208 K/UL (ref 150–450)
PLATELET ESTIMATE: ABNORMAL
PMV BLD AUTO: 8.6 FL (ref 6–12)
POTASSIUM SERPL-SCNC: 3.4 MMOL/L (ref 3.7–5.3)
RBC # BLD: 3.79 M/UL (ref 4–5.2)
RBC # BLD: ABNORMAL 10*6/UL
SEG NEUTROPHILS: 74 % (ref 36–66)
SEGMENTED NEUTROPHILS ABSOLUTE COUNT: 6.1 K/UL (ref 1.3–9.1)
SODIUM BLD-SCNC: 142 MMOL/L (ref 135–144)
WBC # BLD: 8.3 K/UL (ref 3.5–11)
WBC # BLD: ABNORMAL 10*3/UL

## 2018-01-15 PROCEDURE — 93005 ELECTROCARDIOGRAM TRACING: CPT

## 2018-01-15 PROCEDURE — 83735 ASSAY OF MAGNESIUM: CPT

## 2018-01-15 PROCEDURE — 94003 VENT MGMT INPAT SUBQ DAY: CPT

## 2018-01-15 PROCEDURE — 94770 HC ETCO2 MONITOR DAILY: CPT

## 2018-01-15 PROCEDURE — 2060000000 HC ICU INTERMEDIATE R&B

## 2018-01-15 PROCEDURE — 2580000003 HC RX 258: Performed by: RADIOLOGY

## 2018-01-15 PROCEDURE — 80048 BASIC METABOLIC PNL TOTAL CA: CPT

## 2018-01-15 PROCEDURE — 6360000002 HC RX W HCPCS: Performed by: INTERNAL MEDICINE

## 2018-01-15 PROCEDURE — 94762 N-INVAS EAR/PLS OXIMTRY CONT: CPT

## 2018-01-15 PROCEDURE — 6360000002 HC RX W HCPCS: Performed by: RADIOLOGY

## 2018-01-15 PROCEDURE — 82947 ASSAY GLUCOSE BLOOD QUANT: CPT

## 2018-01-15 PROCEDURE — 85025 COMPLETE CBC W/AUTO DIFF WBC: CPT

## 2018-01-15 PROCEDURE — 6370000000 HC RX 637 (ALT 250 FOR IP): Performed by: INTERNAL MEDICINE

## 2018-01-15 PROCEDURE — 6370000000 HC RX 637 (ALT 250 FOR IP): Performed by: RADIOLOGY

## 2018-01-15 PROCEDURE — 36415 COLL VENOUS BLD VENIPUNCTURE: CPT

## 2018-01-15 RX ORDER — POTASSIUM CHLORIDE 7.45 MG/ML
10 INJECTION INTRAVENOUS PRN
Status: DISCONTINUED | OUTPATIENT
Start: 2018-01-15 | End: 2018-01-15

## 2018-01-15 RX ORDER — POTASSIUM CHLORIDE 20MEQ/15ML
40 LIQUID (ML) ORAL PRN
Status: DISCONTINUED | OUTPATIENT
Start: 2018-01-15 | End: 2018-01-16 | Stop reason: HOSPADM

## 2018-01-15 RX ORDER — POTASSIUM CHLORIDE 7.45 MG/ML
10 INJECTION INTRAVENOUS PRN
Status: DISCONTINUED | OUTPATIENT
Start: 2018-01-15 | End: 2018-01-16 | Stop reason: HOSPADM

## 2018-01-15 RX ORDER — POTASSIUM CHLORIDE 20 MEQ/1
40 TABLET, EXTENDED RELEASE ORAL PRN
Status: DISCONTINUED | OUTPATIENT
Start: 2018-01-15 | End: 2018-01-16 | Stop reason: HOSPADM

## 2018-01-15 RX ADMIN — POTASSIUM CHLORIDE 40 MEQ: 20 TABLET, EXTENDED RELEASE ORAL at 09:32

## 2018-01-15 RX ADMIN — MIDAZOLAM 2 MG: 1 INJECTION INTRAMUSCULAR; INTRAVENOUS at 02:07

## 2018-01-15 RX ADMIN — FENTANYL CITRATE 25 MCG: 50 INJECTION, SOLUTION INTRAMUSCULAR; INTRAVENOUS at 00:05

## 2018-01-15 RX ADMIN — PROPOFOL 70 MCG/KG/MIN: 10 INJECTION, EMULSION INTRAVENOUS at 01:48

## 2018-01-15 RX ADMIN — CALCIUM GLUCONATE 1 G: 94 INJECTION, SOLUTION INTRAVENOUS at 09:32

## 2018-01-15 ASSESSMENT — PAIN SCALES - GENERAL
PAINLEVEL_OUTOF10: 6
PAINLEVEL_OUTOF10: 4

## 2018-01-15 ASSESSMENT — ENCOUNTER SYMPTOMS
NAUSEA: 0
SHORTNESS OF BREATH: 0
COUGH: 0
ABDOMINAL PAIN: 0
DIARRHEA: 0
VOMITING: 0
SPUTUM PRODUCTION: 0
SORE THROAT: 1

## 2018-01-15 ASSESSMENT — PAIN DESCRIPTION - PAIN TYPE: TYPE: CHRONIC PAIN

## 2018-01-15 ASSESSMENT — PULMONARY FUNCTION TESTS
PIF_VALUE: 18
PIF_VALUE: 13
PIF_VALUE: 11
PIF_VALUE: 11

## 2018-01-15 ASSESSMENT — PAIN DESCRIPTION - LOCATION: LOCATION: BACK

## 2018-01-15 NOTE — PROGRESS NOTES
Candy Barrios [facility] for the emergency room contacted regarding the patient's clothing that the patient insists are still missing. At this time Candy Barrios discussed with the charge nurse, Arizona Holter, RN, and it was stated that they did not have any of the patient's belongings in their possession. Therefore, having called security, the staff sergeant, as well as the REGINA in the ER, the emergency room itself, as well as ICU twice, this writer is unable to find any belongings other than the aforementioned in previous notes that include medications, rings, necklace, and a license. This information will be shared with the patient.
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
## 2018-01-15 NOTE — FLOWSHEET NOTE
01/15/18 0500   Provider Notification   Reason for Communication Review case   Provider Name Dr Segundo Yanez   Provider Notification Physician   Method of Communication Secure Message     Informed of consult.

## 2018-01-15 NOTE — PROGRESS NOTES
Talked with Mauro Garvin RN in the ICU and no patient belongings were brought up to the ICU with the patient, as well as no tag stating the belongings were with security. The patient is unaware if she had any belongings/clothes with her. This writer will contact security to check.
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
## 2018-01-15 NOTE — PROGRESS NOTES
This writer spoke with the chintan of public safety and he stated that they did not have the patient's belongings. At this time this writer then contacted the [facility] in the ER and spoke with Too Saeed. Per Too Saeed he does not see the patient's belongings, however he will \"take a look around\" and get back to this writer. This writer will continue to follow closely and will call Too Saeed back in ten minutes to check.

## 2018-01-15 NOTE — PROGRESS NOTES
ICU Progress Note (Non-Vent)  [facility]    Patient - Arron Guthrie,  Age - 34 y.o.    - 1985      Room Number -    MRN -  [id number]   Acct # - [de-identified]  Date of Admission -  2018  3:14 AM    Events of Past 24 Hours   Pt self extubated herself last night  Denies any SOB. Resting comfortably on RA  Chronic cough with brownish/yellowish sputum    Vitals    height is 5' 5\" (1.651 m) and weight is 215 lb 6.2 oz (97.7 kg). Her temperature is 98.6 °F (37 °C). Her blood pressure is 112/61 and her pulse is 99. Her respiration is 18 and oxygen saturation is 99%.        Temperature Range: Temp: 98.6 °F (37 °C) Temp  Av °F (36.7 °C)  Min: 97.6 °F (36.4 °C)  Max: 98.6 °F (37 °C)  BP Range:  Systolic (41LDF), FZX:407 , Min:85 , RMA:582     Diastolic (71BSC), ZCP:56, Min:36, Max:81    Pulse Range: Pulse  Av.3  Min: 79  Max: 103  Respiration Range: Resp  Av.8  Min: 15  Max: 24  Current Pulse Ox[de-identified]  SpO2: 99 %  24HR Pulse Ox Range:  SpO2  Av.8 %  Min: 99 %  Max: 100 %  Oxygen Amount and Delivery: O2 Flow Rate (L/min): 15 L/min    Wt Readings from Last 3 Encounters:   18 215 lb 6.2 oz (97.7 kg)   17 220 lb (99.8 kg)     I/O     Intake/Output Summary (Last 24 hours) at 01/15/18 0938  Last data filed at 01/15/18 0400   Gross per 24 hour   Intake          1511.96 ml   Output              510 ml   Net          1001.96 ml     DRAIN/TUBE OUTPUT       Invasive Lines   ICP PRESSURE RANGE  No Data Recorded  CVP PRESSURE RANGE  No Data Recorded      Medications      calcium gluconate IVPB  1 g Intravenous Once    sodium chloride flush  10 mL Intravenous 2 times per day    enoxaparin  40 mg Subcutaneous Daily    nicotine  1 patch Transdermal Daily     potassium chloride **OR** potassium chloride **OR** potassium chloride, sodium chloride flush, acetaminophen, albuterol sulfate complaints    Cardiovascular   Hemodynamically stable     Gastrointestinal       Renal       Infectious Disease       Hematology/Oncology       Endocrine       Social/Spiritual/DNR/Disposition/Other   ASSESSMENT:  1. Acute respiratory failure with hypoxia, mostly secondary to drug  overdose and multiple drug abuse/ self extubate this am.  2.  Agitation and question if patient has any underlying anxiety or  Depression./ improved  3. Lactic acidosis, improved. Lactate was 3.4 with some mild acidosis,  came back to normal.  4.  Drug overdoses, possible suicidal attempt. Not sure if patient has any  underlying psych disease. 5.  Multiple drug abuse. She was positive for cocaine, marijuana and  opiates in the urine drug screen. 6.  Tobacco abuse. 7.  Obesity, BMI of 35.9.     PLAN OF TREATMENT:  1. Continue current tx, doing well off vent  Psych evaluation   nicotine patch. on Lovenox.   Pt clear for stepdown      Electronically signed by Naima Townsend on 1/15/2018 at 9:38 AM

## 2018-01-15 NOTE — PROGRESS NOTES
sodium chloride 100 mL/hr at 18 2229     PRN Meds: potassium chloride, sodium chloride flush, acetaminophen, albuterol sulfate HFA    Data:     Past Medical History:   has no past medical history on file. Social History:   reports that she has been smoking Cigarettes. She has been smoking about 1.00 pack per day. She does not have any smokeless tobacco history on file. She reports that she uses drugs, including Cocaine and Marijuana. She reports that she does not drink alcohol. Family History: No family history on file. Vitals:  BP (!) 92/40   Pulse 96   Temp 97.7 °F (36.5 °C) (Axillary)   Resp 18   Ht 5' 5\" (1.651 m)   Wt 215 lb 6.2 oz (97.7 kg)   LMP  (LMP Unknown)   SpO2 100%   BMI 35.84 kg/m²   Temp (24hrs), Av.8 °F (36.6 °C), Min:97.6 °F (36.4 °C), Max:98.1 °F (36.7 °C)    No results for input(s): POCGLU in the last 72 hours. I/O (24Hr):     Intake/Output Summary (Last 24 hours) at 01/15/18 0737  Last data filed at 18 2200   Gross per 24 hour   Intake          1511.96 ml   Output               60 ml   Net          1451.96 ml       Labs:    Recent Results (from the past 24 hour(s))   Lactic Acid    Collection Time: 18  7:46 AM   Result Value Ref Range    Lactic Acid 1.8 0.5 - 2.2 mmol/L   SALICYLATE LEVEL    Collection Time: 18  7:46 AM   Result Value Ref Range    Salicylate Lvl <1 (L) 3 - 10 mg/dL   ACETAMINOPHEN LEVEL    Collection Time: 18  7:46 AM   Result Value Ref Range    Acetaminophen Level <10 (L) 10 - 30 ug/mL   Troponin    Collection Time: 18  7:46 AM   Result Value Ref Range    Troponin T <0.03 <0.03 ng/mL    Troponin Interp         TROPONIN    Collection Time: 18  3:48 PM   Result Value Ref Range    Troponin T <0.03 <0.03 ng/mL    Troponin Interp         CBC auto differential    Collection Time: 01/15/18  4:08 AM   Result Value Ref Range    WBC 8.3 3.5 - 11.0 k/uL    RBC 3.79 (L) 4.0 - 5.2 m/uL    Hemoglobin 12.0 12.0 - 16.0 g/dL

## 2018-01-16 ENCOUNTER — HOSPITAL ENCOUNTER (INPATIENT)
Age: 33
LOS: 6 days | Discharge: HOME | DRG: 753 | End: 2018-01-22
Attending: PSYCHIATRY & NEUROLOGY | Admitting: PSYCHIATRY & NEUROLOGY
Payer: MEDICARE

## 2018-01-16 VITALS
OXYGEN SATURATION: 99 % | DIASTOLIC BLOOD PRESSURE: 55 MMHG | TEMPERATURE: 97.9 F | RESPIRATION RATE: 19 BRPM | BODY MASS INDEX: 35.89 KG/M2 | HEART RATE: 75 BPM | HEIGHT: 65 IN | SYSTOLIC BLOOD PRESSURE: 108 MMHG | WEIGHT: 215.39 LBS

## 2018-01-16 PROBLEM — F32.A DEPRESSION DETERMINED BY EXAMINATION: Status: ACTIVE | Noted: 2018-01-16

## 2018-01-16 PROCEDURE — 99239 HOSP IP/OBS DSCHRG MGMT >30: CPT | Performed by: INTERNAL MEDICINE

## 2018-01-16 PROCEDURE — 6370000000 HC RX 637 (ALT 250 FOR IP): Performed by: INTERNAL MEDICINE

## 2018-01-16 PROCEDURE — 6370000000 HC RX 637 (ALT 250 FOR IP): Performed by: PSYCHIATRY & NEUROLOGY

## 2018-01-16 PROCEDURE — 1240000000 HC EMOTIONAL WELLNESS R&B

## 2018-01-16 RX ORDER — CYCLOBENZAPRINE HCL 10 MG
10 TABLET ORAL 3 TIMES DAILY PRN
Status: DISCONTINUED | OUTPATIENT
Start: 2018-01-16 | End: 2018-01-22 | Stop reason: HOSPADM

## 2018-01-16 RX ORDER — GABAPENTIN 300 MG/1
600 CAPSULE ORAL 3 TIMES DAILY
Status: ON HOLD | COMMUNITY
Start: 2017-08-25 | End: 2018-01-21 | Stop reason: HOSPADM

## 2018-01-16 RX ORDER — ALBUTEROL SULFATE 90 UG/1
4 AEROSOL, METERED RESPIRATORY (INHALATION) EVERY 6 HOURS PRN
Status: CANCELLED | OUTPATIENT
Start: 2018-01-16

## 2018-01-16 RX ORDER — NICOTINE 21 MG/24HR
1 PATCH, TRANSDERMAL 24 HOURS TRANSDERMAL DAILY
Status: DISCONTINUED | OUTPATIENT
Start: 2018-01-17 | End: 2018-01-22 | Stop reason: HOSPADM

## 2018-01-16 RX ORDER — NICOTINE 21 MG/24HR
1 PATCH, TRANSDERMAL 24 HOURS TRANSDERMAL DAILY
Status: DISCONTINUED | OUTPATIENT
Start: 2018-01-17 | End: 2018-01-16 | Stop reason: SDUPTHER

## 2018-01-16 RX ORDER — ACETAMINOPHEN 325 MG/1
650 TABLET ORAL EVERY 4 HOURS PRN
Status: CANCELLED | OUTPATIENT
Start: 2018-01-16

## 2018-01-16 RX ORDER — CYCLOBENZAPRINE HCL 10 MG
10 TABLET ORAL 3 TIMES DAILY PRN
Status: ON HOLD | COMMUNITY
Start: 2017-08-25 | End: 2018-01-16

## 2018-01-16 RX ORDER — GABAPENTIN 300 MG/1
600 CAPSULE ORAL 3 TIMES DAILY
Status: DISCONTINUED | OUTPATIENT
Start: 2018-01-16 | End: 2018-01-22 | Stop reason: HOSPADM

## 2018-01-16 RX ORDER — NICOTINE 21 MG/24HR
1 PATCH, TRANSDERMAL 24 HOURS TRANSDERMAL DAILY
Status: CANCELLED | OUTPATIENT
Start: 2018-01-17

## 2018-01-16 RX ADMIN — GABAPENTIN 600 MG: 300 CAPSULE ORAL at 21:51

## 2018-01-16 RX ADMIN — Medication 1 LOZENGE: at 21:56

## 2018-01-16 ASSESSMENT — SLEEP AND FATIGUE QUESTIONNAIRES
DO YOU USE A SLEEP AID: NO
DO YOU HAVE DIFFICULTY SLEEPING: NO
AVERAGE NUMBER OF SLEEP HOURS: 5

## 2018-01-16 ASSESSMENT — PAIN DESCRIPTION - LOCATION: LOCATION: BACK

## 2018-01-16 ASSESSMENT — PAIN SCALES - GENERAL: PAINLEVEL_OUTOF10: 7

## 2018-01-16 ASSESSMENT — LIFESTYLE VARIABLES: HISTORY_ALCOHOL_USE: NO

## 2018-01-16 ASSESSMENT — PAIN DESCRIPTION - PAIN TYPE: TYPE: CHRONIC PAIN

## 2018-01-16 NOTE — DISCHARGE SUMMARY
[facility]    Patient name:  Yanet Thompson  YOB: 1985  Primary Care Physician: No primary care provider on file. Date of admission:  1/14/2018  3:14 AM  Date of discharge:     DISCHARGE DIAGNOSES     Principal Problem:    Suicide attempt by multiple drug overdose ([address]  Active Problems:    Acute respiratory failure with hypoxia [facility])      HOSPITAL COURSE      Multidrug overdose suicidal  Encephalopathy intubated   seld extubated    Improved   medically stable transfer to bhi   pink slip completed  Consultants:  -psychiatry    Procedures:  None    DISCHARGE MEDICATIONS        Eduardootto Cerdamichael   Home Medication Instructions ZPL:[id number]    Printed on:01/16/18 3040   Medication Information                      cyclobenzaprine (FLEXERIL) 10 MG tablet  Take 10 mg by mouth                 DISPOSITION AND FOLLOW-UP     Disposition: bhi    Condition: Stable     Diet:  Regular diet     Activity: As tolerated     Follow-up:   with No primary care provider on file.,    Discharge time spent on pt and paperworki more than [address]    Phone [phone]   Fax: [phone]  Answering Service: [phone]

## 2018-01-16 NOTE — PLAN OF CARE
Problem: Falls - Risk of  Goal: Absence of falls  Outcome: Ongoing  Pt alert and oriented. Pt was instructed on how to use the call light. Pt used call light appropriately. Non-slip socks are in place. Bed is in lowest and locked position. No falls noted this shift. Problem: Suicide risk  Goal: Provide patient with safe environment  Provide patient with safe environment   Outcome: Met This Shift  Pt with 1:1 sitter and in suicide precautions. Problem: Pain:  Goal: Pain level will decrease  Pain level will decrease   Outcome: Met This Shift  Pt did not complain of pain during this shift.

## 2018-01-16 NOTE — PROGRESS NOTES
.better  Depression./   4. Drug overdoses, suicidal attempt. 5.  Multiple drug abuse. She was positive for cocaine, marijuana and opiates in the urine drug screen. 6.  Tobacco abuse/ still craving  7. Obesity, BMI of 35.9.     PLAN OF TREATMENT:  1. Continue current tx,  Psych following   nicotine patch. on Lovenox.   Will sign off, please call if needed      Electronically signed by Tank Raygoza MD on 1/16/2018 at 12:54 PM

## 2018-01-17 PROBLEM — F32.A DEPRESSION DETERMINED BY EXAMINATION: Status: RESOLVED | Noted: 2018-01-16 | Resolved: 2018-01-17

## 2018-01-17 PROBLEM — F31.9 DEPRESSED BIPOLAR I DISORDER (HCC): Status: ACTIVE | Noted: 2018-01-17

## 2018-01-17 PROCEDURE — 1240000000 HC EMOTIONAL WELLNESS R&B

## 2018-01-17 PROCEDURE — 6370000000 HC RX 637 (ALT 250 FOR IP): Performed by: PSYCHIATRY & NEUROLOGY

## 2018-01-17 RX ORDER — ARIPIPRAZOLE 10 MG/1
10 TABLET ORAL DAILY
Status: DISCONTINUED | OUTPATIENT
Start: 2018-01-17 | End: 2018-01-22 | Stop reason: HOSPADM

## 2018-01-17 RX ORDER — CITALOPRAM 20 MG/1
20 TABLET ORAL DAILY
Status: DISCONTINUED | OUTPATIENT
Start: 2018-01-17 | End: 2018-01-22 | Stop reason: HOSPADM

## 2018-01-17 RX ADMIN — GABAPENTIN 600 MG: 300 CAPSULE ORAL at 15:26

## 2018-01-17 RX ADMIN — Medication 1 LOZENGE: at 04:04

## 2018-01-17 RX ADMIN — Medication 1 LOZENGE: at 18:01

## 2018-01-17 RX ADMIN — GABAPENTIN 600 MG: 300 CAPSULE ORAL at 21:46

## 2018-01-17 RX ADMIN — Medication 1 LOZENGE: at 09:25

## 2018-01-17 RX ADMIN — ARIPIPRAZOLE 10 MG: 10 TABLET ORAL at 15:28

## 2018-01-17 RX ADMIN — Medication 1 LOZENGE: at 21:46

## 2018-01-17 RX ADMIN — GABAPENTIN 600 MG: 300 CAPSULE ORAL at 09:02

## 2018-01-17 RX ADMIN — CYCLOBENZAPRINE HYDROCHLORIDE 10 MG: 10 TABLET, FILM COATED ORAL at 21:46

## 2018-01-17 RX ADMIN — CITALOPRAM HYDROBROMIDE 20 MG: 20 TABLET ORAL at 15:29

## 2018-01-17 ASSESSMENT — PAIN SCALES - GENERAL
PAINLEVEL_OUTOF10: 9
PAINLEVEL_OUTOF10: 9

## 2018-01-17 ASSESSMENT — SLEEP AND FATIGUE QUESTIONNAIRES
DIFFICULTY ARISING: NO
AVERAGE NUMBER OF SLEEP HOURS: 5
RESTFUL SLEEP: NO
SLEEP PATTERN: DIFFICULTY FALLING ASLEEP;DISTURBED/INTERRUPTED SLEEP;RESTLESSNESS;NIGHTMARES/TERRORS
DIFFICULTY STAYING ASLEEP: YES
DIFFICULTY FALLING ASLEEP: YES
DO YOU USE A SLEEP AID: YES
DO YOU HAVE DIFFICULTY SLEEPING: YES

## 2018-01-17 ASSESSMENT — PAIN DESCRIPTION - LOCATION
LOCATION: BACK
LOCATION: BACK

## 2018-01-17 ASSESSMENT — LIFESTYLE VARIABLES: HISTORY_ALCOHOL_USE: YES

## 2018-01-17 NOTE — PROGRESS NOTES
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
## 2018-01-18 PROCEDURE — 1240000000 HC EMOTIONAL WELLNESS R&B

## 2018-01-18 PROCEDURE — 6370000000 HC RX 637 (ALT 250 FOR IP): Performed by: PSYCHIATRY & NEUROLOGY

## 2018-01-18 RX ADMIN — CITALOPRAM HYDROBROMIDE 20 MG: 20 TABLET ORAL at 09:39

## 2018-01-18 RX ADMIN — GABAPENTIN 600 MG: 300 CAPSULE ORAL at 09:39

## 2018-01-18 RX ADMIN — Medication 1 LOZENGE: at 20:49

## 2018-01-18 RX ADMIN — GABAPENTIN 600 MG: 300 CAPSULE ORAL at 13:41

## 2018-01-18 RX ADMIN — CYCLOBENZAPRINE HYDROCHLORIDE 10 MG: 10 TABLET, FILM COATED ORAL at 21:02

## 2018-01-18 RX ADMIN — ARIPIPRAZOLE 10 MG: 10 TABLET ORAL at 09:39

## 2018-01-18 RX ADMIN — Medication 1 LOZENGE: at 13:41

## 2018-01-18 RX ADMIN — GABAPENTIN 600 MG: 300 CAPSULE ORAL at 21:02

## 2018-01-18 ASSESSMENT — PAIN SCALES - GENERAL
PAINLEVEL_OUTOF10: 1
PAINLEVEL_OUTOF10: 10

## 2018-01-18 ASSESSMENT — PAIN DESCRIPTION - PAIN TYPE: TYPE: CHRONIC PAIN

## 2018-01-18 ASSESSMENT — PAIN DESCRIPTION - LOCATION
LOCATION: BACK
LOCATION: BACK

## 2018-01-18 NOTE — PROGRESS NOTES
Patient states that she  is feeling slightly better. Still experience low stress tolerance, irritability and anhedonia. Affect-Brighter  . Mood -   Dysphoric  Thought process -   improving  Cognition -  Improving  Memory- Recent-Adequate                Remote-OK  Orientation-Oriented to time ,place and person                                               Insight-Partial  Judgement-Superficial                                                Attempted to develop insight. Medications reviewed. Side effects of medications reviewed and medication education provided. No side effects including akathisia,tremers or orthostasis reported or observed. Plan: Stabilization with antidepressant  medications,group therapy and develop coping skills and stress management.

## 2018-01-19 PROCEDURE — 6370000000 HC RX 637 (ALT 250 FOR IP): Performed by: PSYCHIATRY & NEUROLOGY

## 2018-01-19 PROCEDURE — 1240000000 HC EMOTIONAL WELLNESS R&B

## 2018-01-19 RX ORDER — IBUPROFEN 800 MG/1
800 TABLET ORAL 3 TIMES DAILY PRN
Status: DISCONTINUED | OUTPATIENT
Start: 2018-01-19 | End: 2018-01-22 | Stop reason: HOSPADM

## 2018-01-19 RX ORDER — IBUPROFEN 800 MG/1
800 TABLET ORAL 3 TIMES DAILY PRN
Status: DISCONTINUED | OUTPATIENT
Start: 2018-01-19 | End: 2018-01-19

## 2018-01-19 RX ADMIN — CYCLOBENZAPRINE HYDROCHLORIDE 10 MG: 10 TABLET, FILM COATED ORAL at 21:15

## 2018-01-19 RX ADMIN — ARIPIPRAZOLE 10 MG: 10 TABLET ORAL at 08:30

## 2018-01-19 RX ADMIN — GABAPENTIN 600 MG: 300 CAPSULE ORAL at 21:15

## 2018-01-19 RX ADMIN — GABAPENTIN 600 MG: 300 CAPSULE ORAL at 08:30

## 2018-01-19 RX ADMIN — IBUPROFEN 800 MG: 800 TABLET ORAL at 22:07

## 2018-01-19 RX ADMIN — Medication 1 LOZENGE: at 23:48

## 2018-01-19 RX ADMIN — CITALOPRAM HYDROBROMIDE 20 MG: 20 TABLET ORAL at 08:30

## 2018-01-19 RX ADMIN — Medication 1 LOZENGE: at 10:56

## 2018-01-19 RX ADMIN — GABAPENTIN 600 MG: 300 CAPSULE ORAL at 13:28

## 2018-01-19 ASSESSMENT — PAIN SCALES - GENERAL: PAINLEVEL_OUTOF10: 5

## 2018-01-19 ASSESSMENT — PAIN - FUNCTIONAL ASSESSMENT: PAIN_FUNCTIONAL_ASSESSMENT: 0-10

## 2018-01-19 ASSESSMENT — PAIN DESCRIPTION - DESCRIPTORS: DESCRIPTORS: ACHING;CRAMPING

## 2018-01-19 NOTE — PROGRESS NOTES
Pt declined to attend 1600 wellness group despite staff encouragement. Will continue to encourage pt to attend unit programming.

## 2018-01-20 PROCEDURE — 6370000000 HC RX 637 (ALT 250 FOR IP): Performed by: PSYCHIATRY & NEUROLOGY

## 2018-01-20 PROCEDURE — 1240000000 HC EMOTIONAL WELLNESS R&B

## 2018-01-20 RX ADMIN — IBUPROFEN 800 MG: 800 TABLET ORAL at 07:52

## 2018-01-20 RX ADMIN — GABAPENTIN 600 MG: 300 CAPSULE ORAL at 07:53

## 2018-01-20 RX ADMIN — CYCLOBENZAPRINE HYDROCHLORIDE 10 MG: 10 TABLET, FILM COATED ORAL at 21:23

## 2018-01-20 RX ADMIN — GABAPENTIN 600 MG: 300 CAPSULE ORAL at 14:12

## 2018-01-20 RX ADMIN — IBUPROFEN 800 MG: 800 TABLET ORAL at 21:23

## 2018-01-20 RX ADMIN — IBUPROFEN 800 MG: 800 TABLET ORAL at 15:22

## 2018-01-20 RX ADMIN — GABAPENTIN 600 MG: 300 CAPSULE ORAL at 21:23

## 2018-01-20 RX ADMIN — Medication 1 LOZENGE: at 22:36

## 2018-01-20 RX ADMIN — ARIPIPRAZOLE 10 MG: 10 TABLET ORAL at 07:52

## 2018-01-20 RX ADMIN — CITALOPRAM HYDROBROMIDE 20 MG: 20 TABLET ORAL at 07:52

## 2018-01-20 ASSESSMENT — PAIN SCALES - GENERAL
PAINLEVEL_OUTOF10: 9
PAINLEVEL_OUTOF10: 8
PAINLEVEL_OUTOF10: 8
PAINLEVEL_OUTOF10: 3
PAINLEVEL_OUTOF10: 8

## 2018-01-20 ASSESSMENT — PAIN DESCRIPTION - DESCRIPTORS: DESCRIPTORS: ACHING;DISCOMFORT

## 2018-01-20 ASSESSMENT — PAIN DESCRIPTION - FREQUENCY: FREQUENCY: INTERMITTENT

## 2018-01-20 ASSESSMENT — PAIN DESCRIPTION - LOCATION: LOCATION: BACK

## 2018-01-20 NOTE — PROGRESS NOTES
PSYCHOEDUCATION GROUP NOTE    Date: 1-20-18  Start Time: 1600  End Time: 1625    Number Participants in Group:  7    Goal:  Patient will demonstrate increased interpersonal interaction   Topic: wellness    Discipline Responsible:   OT  AT   x Nsg.  RT MHP Other       Participation Level:     None  Minimal    Active Listener  Interactive    Monopolizing         Participation Quality:  x Appropriate  Inappropriate          Attentive        Intrusive          Sharing        Resistant          Supportive        Lethargic       Affective:    Congruent  Incongruent  Blunted  Flat    Constricted  Anxious  Elated  Angry    Labile  Depressed  Other         Cognitive:  x Alert  Oriented PPTP     Concentration  G x F  P   Attention Span  G x F  P   Short-Term Memory  G xx F  P   Long-Term Memory  G x F  P   ProblemSolving/  Decision Making  G x F  P   Ability to Process  Information  G x F  P      Contributing Factors             Delusional             Hallucinating             Flight of Ideas             Other:       Modes of Intervention:  x Education  Support x Exploration    Clarifying xx Problem Solving  Confrontation    Socialization  Limit Setting  Reality Testing    Activity  Movement  Media    Other:            Response to Learning:  x Able to verbalize current knowledge/experience   x Able to verbalize/acknowledge new learning   x Able to retain information    Capable of insight    Able to change behavior    Progressing to goal    Other:        Comments:

## 2018-01-20 NOTE — BH NOTE
PSYCHOEDUCATION GROUP NOTE    Date: 1/20/16       Start Time: 1100      End Time: 3139    Number Participants in Group: 8/19     Name of group: Wellness Group     Discipline Responsible:   OT  AT  [facility].  x MHP Other       Participation Level:     None  Minimal   x Active Listener x Interactive    Monopolizing         Participation Quality:  x Appropriate  Inappropriate   x       Attentive        Intrusive   x       Sharing        Resistant   x       Supportive        Lethargic       Affective:   x Congruent  Incongruent  Blunted  Flat    Constricted  Anxious  Elated  Angry    Labile  Depressed  Other         Cognitive:  x Alert x Oriented PPTP     Concentration x G  F  P   Attention Span x G  F  P   Short-Term Memory x G  F  P   Long-Term Memory x G  F  P   ProblemSolving/  Decision Making  G x F  P   Ability to Process  Information x G  F  P      Contributing Factors             Delusional             Hallucinating             Flight of Ideas             Other:        Modes of Intervention:  x Education x Support x Exploration   x Clarifying x Problem Solving x Confrontation   x Socialization x Limit Setting  Reality Testing    Activity  Movement  Media    Other:            Response to Learning:  x Able to verbalize current knowledge/experience   x Able to verbalize/acknowledge new learning   x Able to retain information   x Capable of insight   x Able to change behavior   x Progressing to goal    Other:        Comments:
to quit, available resources  ( ) Referral for counseling faxed to [facility]                                           ( x) Patient refused counseling  ( ) Patient has not smoked in the last 30 days      Pt admitted with followings belongings:  Dentures: None  Vision - Corrective Lenses: None  Hearing Aid: None  Jewelry: Ring, Necklace  Body Piercings Removed: No  Clothing: None  Were All Patient Medications Collected?: Yes  Other Valuables: None      Valuables placed in safe in security envelope, number:  [id number]. Patient's home medications were reviewed and continued . Patient oriented to surroundings and program expectations and copy of patient rights given. Received admission packet:  yes. Consents reviewed, signed yes. Patient verbalize understanding:  yes.     Patient education on precautions: yes                   Eriberto Jorgensen RN

## 2018-01-21 PROCEDURE — 1240000000 HC EMOTIONAL WELLNESS R&B

## 2018-01-21 PROCEDURE — 6370000000 HC RX 637 (ALT 250 FOR IP): Performed by: PSYCHIATRY & NEUROLOGY

## 2018-01-21 RX ORDER — ARIPIPRAZOLE 10 MG/1
10 TABLET ORAL DAILY
Qty: 30 TABLET | Refills: 0 | Status: SHIPPED | OUTPATIENT
Start: 2018-01-22 | End: 2020-04-01

## 2018-01-21 RX ORDER — NICOTINE 21 MG/24HR
1 PATCH, TRANSDERMAL 24 HOURS TRANSDERMAL DAILY
Qty: 30 PATCH | Refills: 3
Start: 2018-01-22 | End: 2020-04-01

## 2018-01-21 RX ORDER — GABAPENTIN 300 MG/1
600 CAPSULE ORAL 3 TIMES DAILY
Qty: 90 CAPSULE | Refills: 0 | Status: SHIPPED | OUTPATIENT
Start: 2018-01-21 | End: 2020-04-01

## 2018-01-21 RX ORDER — CITALOPRAM 20 MG/1
20 TABLET ORAL DAILY
Qty: 30 TABLET | Refills: 0 | Status: SHIPPED | OUTPATIENT
Start: 2018-01-22 | End: 2020-04-01

## 2018-01-21 RX ADMIN — CITALOPRAM HYDROBROMIDE 20 MG: 20 TABLET ORAL at 07:59

## 2018-01-21 RX ADMIN — IBUPROFEN 800 MG: 800 TABLET ORAL at 22:04

## 2018-01-21 RX ADMIN — ARIPIPRAZOLE 10 MG: 10 TABLET ORAL at 07:59

## 2018-01-21 RX ADMIN — Medication 1 LOZENGE: at 10:11

## 2018-01-21 RX ADMIN — CYCLOBENZAPRINE HYDROCHLORIDE 10 MG: 10 TABLET, FILM COATED ORAL at 20:26

## 2018-01-21 RX ADMIN — CYCLOBENZAPRINE HYDROCHLORIDE 10 MG: 10 TABLET, FILM COATED ORAL at 08:00

## 2018-01-21 RX ADMIN — GABAPENTIN 600 MG: 300 CAPSULE ORAL at 20:26

## 2018-01-21 RX ADMIN — GABAPENTIN 600 MG: 300 CAPSULE ORAL at 07:59

## 2018-01-21 RX ADMIN — IBUPROFEN 800 MG: 800 TABLET ORAL at 07:59

## 2018-01-21 RX ADMIN — GABAPENTIN 600 MG: 300 CAPSULE ORAL at 15:05

## 2018-01-21 RX ADMIN — IBUPROFEN 800 MG: 800 TABLET ORAL at 15:06

## 2018-01-21 ASSESSMENT — PAIN SCALES - GENERAL
PAINLEVEL_OUTOF10: 5
PAINLEVEL_OUTOF10: 8
PAINLEVEL_OUTOF10: 8
PAINLEVEL_OUTOF10: 9

## 2018-01-21 NOTE — H&P
HISTORY and PHYSICAL    NAME:  Sean Clay  MRN: [id number]   YOB: 1985   Date: 2018   Age: 34 y.o. Gender: female         COMPLAINT AND PRESENT HISTORY: Sean Clay is a 34 y.o. female who was admitted for a suicide attempt. She had sent pictures to her  of her ingesting pills. When the EMS arrived on scene the patient was unresponsive. She was given Narcan with little improvement. When she arrived to the ED she was tachypneic at approximately 40 breaths per minute and tachycardic at 156. She did not respond to sternal rub and had no purposeful movements. She was then intubated for airway protection. She currently denies any homicidal ideations, A/V hallucinations or delusions or drug or alcohol problems. She does suffer from chronic back pain. Diagnosis: Depression with suicide attempt      Patient states she is unable to remember any medical history at this time other than chronic back pain. Past Surgical History:   Procedure Laterality Date    BACK SURGERY  2016    plates screws fusion     SECTION      x2    and            She states she is unable to remember any family history at this time. Social History     Social History    Marital status: Single     Spouse name: N/A    Number of children: N/A    Years of education: N/A     Social History Main Topics    Smoking status: Current Every Day Smoker     Packs/day: 1.00     Types: Cigarettes    Smokeless tobacco: Not on file    Alcohol use No    Drug use: Yes     Types: Cocaine, Marijuana    Sexual activity: Not on file     Other Topics Concern    Not on file     Social History Narrative    No narrative on file                 Review of Systems:    No Known Allergies    No current facility-administered medications on file prior to encounter.       Current Outpatient Prescriptions on File Prior to Encounter   Medication Sig Dispense Refill    cyclobenzaprine (FLEXERIL) 10 MG tablet

## 2018-01-22 VITALS
SYSTOLIC BLOOD PRESSURE: 109 MMHG | HEART RATE: 87 BPM | BODY MASS INDEX: 36.02 KG/M2 | DIASTOLIC BLOOD PRESSURE: 70 MMHG | RESPIRATION RATE: 17 BRPM | HEIGHT: 64 IN | WEIGHT: 211 LBS | TEMPERATURE: 97.6 F

## 2018-01-22 PROCEDURE — 6370000000 HC RX 637 (ALT 250 FOR IP): Performed by: PSYCHIATRY & NEUROLOGY

## 2018-01-22 PROCEDURE — 5130000000 HC BRIDGE APPOINTMENT

## 2018-01-22 RX ADMIN — Medication 1 LOZENGE: at 06:23

## 2018-01-22 RX ADMIN — GABAPENTIN 600 MG: 300 CAPSULE ORAL at 09:02

## 2018-01-22 RX ADMIN — ARIPIPRAZOLE 10 MG: 10 TABLET ORAL at 09:02

## 2018-01-22 RX ADMIN — IBUPROFEN 800 MG: 800 TABLET ORAL at 07:28

## 2018-01-22 RX ADMIN — CITALOPRAM HYDROBROMIDE 20 MG: 20 TABLET ORAL at 09:02

## 2018-01-22 ASSESSMENT — PAIN SCALES - GENERAL
PAINLEVEL_OUTOF10: 9
PAINLEVEL_OUTOF10: 3

## 2018-01-22 ASSESSMENT — PAIN DESCRIPTION - PAIN TYPE: TYPE: ACUTE PAIN

## 2018-01-22 ASSESSMENT — PAIN DESCRIPTION - LOCATION: LOCATION: BACK

## 2018-01-22 NOTE — CARE COORDINATION
Bridge Appointment completed: Reviewed Discharge Instructions with patient. Patient verbalizes understanding and agreement with the discharge plan using the teachback method.        Discharge Arrangements: [facility], [facility] [address] [place] [id number], [phone], [facility]   To begin/continue services with a psych eval at 10:40am.    Guardian notified: PT is own guardian  Discharge destination/address: Home address of [address]  Transported by:  Vinny Harrington
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
## 2018-01-31 ENCOUNTER — OFFICE VISIT (OUTPATIENT)
Dept: OBGYN CLINIC | Age: 33
End: 2018-01-31
Payer: MEDICARE

## 2018-01-31 VITALS
DIASTOLIC BLOOD PRESSURE: 78 MMHG | SYSTOLIC BLOOD PRESSURE: 123 MMHG | BODY MASS INDEX: 36.05 KG/M2 | HEART RATE: 59 BPM | RESPIRATION RATE: 18 BRPM | WEIGHT: 210 LBS

## 2018-01-31 DIAGNOSIS — Z30.09 BIRTH CONTROL COUNSELING: Primary | ICD-10-CM

## 2018-01-31 PROCEDURE — G8427 DOCREV CUR MEDS BY ELIG CLIN: HCPCS | Performed by: SPECIALIST

## 2018-01-31 PROCEDURE — G8417 CALC BMI ABV UP PARAM F/U: HCPCS | Performed by: SPECIALIST

## 2018-01-31 PROCEDURE — 1111F DSCHRG MED/CURRENT MED MERGE: CPT | Performed by: SPECIALIST

## 2018-01-31 PROCEDURE — G8484 FLU IMMUNIZE NO ADMIN: HCPCS | Performed by: SPECIALIST

## 2018-01-31 PROCEDURE — 4004F PT TOBACCO SCREEN RCVD TLK: CPT | Performed by: SPECIALIST

## 2018-01-31 PROCEDURE — 99203 OFFICE O/P NEW LOW 30 MIN: CPT | Performed by: SPECIALIST

## 2018-01-31 RX ORDER — NORETHINDRONE ACETATE AND ETHINYL ESTRADIOL 1MG-20(21)
1 KIT ORAL DAILY
Qty: 1 PACKET | Refills: 2 | Status: SHIPPED | OUTPATIENT
Start: 2018-01-31 | End: 2020-04-01

## 2018-01-31 ASSESSMENT — ENCOUNTER SYMPTOMS
EYE PAIN: 0
ABDOMINAL PAIN: 0
ABDOMINAL DISTENTION: 0
VOMITING: 0
NAUSEA: 0
COUGH: 0
APNEA: 0
DIARRHEA: 0
CONSTIPATION: 0
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
## 2018-02-07 ENCOUNTER — TELEPHONE (OUTPATIENT)
Dept: OBGYN CLINIC | Age: 33
End: 2018-02-07

## 2018-02-15 ENCOUNTER — TELEPHONE (OUTPATIENT)
Dept: OBGYN CLINIC | Age: 33
End: 2018-02-15

## 2018-02-28 ENCOUNTER — TELEPHONE (OUTPATIENT)
Dept: OBGYN CLINIC | Age: 33
End: 2018-02-28

## 2018-08-17 NOTE — PROGRESS NOTES
Responded to vent alarm and found patient extubated herself and holding her ett in her hand. Pt orally suctioned for thick yellow mucus. Diprivan was turned off per RN. Pt awakening without signs of distress.  spo2 remains % on roomair Full range of motion of upper and lower extremities, no joint tenderness/swelling.

## 2020-04-01 ENCOUNTER — OFFICE VISIT (OUTPATIENT)
Dept: FAMILY MEDICINE CLINIC | Age: 35
End: 2020-04-01
Payer: MEDICARE

## 2020-04-01 VITALS
BODY MASS INDEX: 33.99 KG/M2 | SYSTOLIC BLOOD PRESSURE: 106 MMHG | HEART RATE: 72 BPM | DIASTOLIC BLOOD PRESSURE: 76 MMHG | WEIGHT: 198 LBS

## 2020-04-01 PROBLEM — Z91.51 HISTORY OF SUICIDE ATTEMPT: Status: ACTIVE | Noted: 2020-04-01

## 2020-04-01 PROBLEM — M54.16 LUMBAR RADICULOPATHY: Status: ACTIVE | Noted: 2017-05-22

## 2020-04-01 PROBLEM — T81.9XXA COMPLICATION OF SURGICAL PROCEDURE: Status: ACTIVE | Noted: 2017-05-22

## 2020-04-01 PROCEDURE — G8417 CALC BMI ABV UP PARAM F/U: HCPCS | Performed by: NURSE PRACTITIONER

## 2020-04-01 PROCEDURE — G8427 DOCREV CUR MEDS BY ELIG CLIN: HCPCS | Performed by: NURSE PRACTITIONER

## 2020-04-01 PROCEDURE — 99204 OFFICE O/P NEW MOD 45 MIN: CPT | Performed by: NURSE PRACTITIONER

## 2020-04-01 PROCEDURE — 4004F PT TOBACCO SCREEN RCVD TLK: CPT | Performed by: NURSE PRACTITIONER

## 2020-04-01 RX ORDER — IBUPROFEN 800 MG/1
800 TABLET ORAL
Qty: 90 TABLET | Refills: 5 | Status: SHIPPED | OUTPATIENT
Start: 2020-04-01 | End: 2020-07-30

## 2020-04-01 RX ORDER — GABAPENTIN 300 MG/1
300 CAPSULE ORAL 3 TIMES DAILY
Qty: 90 CAPSULE | Refills: 1 | Status: SHIPPED | OUTPATIENT
Start: 2020-04-01 | End: 2020-07-07

## 2020-04-01 RX ORDER — BUTALBITAL, ACETAMINOPHEN AND CAFFEINE 50; 325; 40 MG/1; MG/1; MG/1
1 TABLET ORAL EVERY 6 HOURS PRN
Qty: 120 TABLET | Refills: 1 | Status: SHIPPED | OUTPATIENT
Start: 2020-04-01 | End: 2021-01-30

## 2020-04-01 ASSESSMENT — ENCOUNTER SYMPTOMS
GASTROINTESTINAL NEGATIVE: 1
COUGH: 0
NAUSEA: 0
ABDOMINAL PAIN: 0
DIARRHEA: 0
SHORTNESS OF BREATH: 0
VOMITING: 0
CONSTIPATION: 0
RESPIRATORY NEGATIVE: 1
BACK PAIN: 0
PHOTOPHOBIA: 1
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
## 2020-07-01 ENCOUNTER — HOSPITAL ENCOUNTER (EMERGENCY)
Age: 35
Discharge: HOME | End: 2020-07-01
Attending: EMERGENCY MEDICINE
Payer: MEDICARE

## 2020-07-01 ENCOUNTER — APPOINTMENT (OUTPATIENT)
Dept: GENERAL RADIOLOGY | Age: 35
End: 2020-07-01
Payer: MEDICARE

## 2020-07-01 VITALS
BODY MASS INDEX: 32.44 KG/M2 | OXYGEN SATURATION: 97 % | RESPIRATION RATE: 16 BRPM | TEMPERATURE: 98.1 F | HEIGHT: 64 IN | SYSTOLIC BLOOD PRESSURE: 150 MMHG | HEART RATE: 92 BPM | WEIGHT: 190 LBS | DIASTOLIC BLOOD PRESSURE: 129 MMHG

## 2020-07-01 LAB
ABSOLUTE EOS #: 0.09 K/UL (ref 0–0.44)
ABSOLUTE IMMATURE GRANULOCYTE: 0.03 K/UL (ref 0–0.3)
ABSOLUTE LYMPH #: 1.47 K/UL (ref 1.1–3.7)
ABSOLUTE MONO #: 0.76 K/UL (ref 0.1–1.2)
ALBUMIN SERPL-MCNC: 3.9 G/DL (ref 3.5–5.2)
ALBUMIN/GLOBULIN RATIO: 1.3 (ref 1–2.5)
ALP BLD-CCNC: 71 U/L (ref 35–104)
ALT SERPL-CCNC: 9 U/L (ref 5–33)
ANION GAP SERPL CALCULATED.3IONS-SCNC: 13 MMOL/L (ref 9–17)
AST SERPL-CCNC: 11 U/L
BASOPHILS # BLD: 1 % (ref 0–2)
BASOPHILS ABSOLUTE: 0.05 K/UL (ref 0–0.2)
BILIRUB SERPL-MCNC: 0.19 MG/DL (ref 0.3–1.2)
BUN BLDV-MCNC: 14 MG/DL (ref 6–20)
BUN/CREAT BLD: ABNORMAL (ref 9–20)
CALCIUM SERPL-MCNC: 8.6 MG/DL (ref 8.6–10.4)
CHLORIDE BLD-SCNC: 107 MMOL/L (ref 98–107)
CO2: 20 MMOL/L (ref 20–31)
CREAT SERPL-MCNC: 0.7 MG/DL (ref 0.5–0.9)
DIFFERENTIAL TYPE: ABNORMAL
EOSINOPHILS RELATIVE PERCENT: 1 % (ref 1–4)
GFR AFRICAN AMERICAN: >60 ML/MIN
GFR NON-AFRICAN AMERICAN: >60 ML/MIN
GFR SERPL CREATININE-BSD FRML MDRD: ABNORMAL ML/MIN/{1.73_M2}
GFR SERPL CREATININE-BSD FRML MDRD: ABNORMAL ML/MIN/{1.73_M2}
GLUCOSE BLD-MCNC: 98 MG/DL (ref 70–99)
HCG QUALITATIVE: NEGATIVE
HCT VFR BLD CALC: 49.2 % (ref 36.3–47.1)
HEMOGLOBIN: 15.8 G/DL (ref 11.9–15.1)
IMMATURE GRANULOCYTES: 0 %
LIPASE: 23 U/L (ref 13–60)
LYMPHOCYTES # BLD: 17 % (ref 24–43)
MCH RBC QN AUTO: 31.7 PG (ref 25.2–33.5)
MCHC RBC AUTO-ENTMCNC: 32.1 G/DL (ref 28.4–34.8)
MCV RBC AUTO: 98.8 FL (ref 82.6–102.9)
MONOCYTES # BLD: 9 % (ref 3–12)
NRBC AUTOMATED: 0 PER 100 WBC
PDW BLD-RTO: 13.4 % (ref 11.8–14.4)
PLATELET # BLD: 247 K/UL (ref 138–453)
PLATELET ESTIMATE: ABNORMAL
PMV BLD AUTO: 10.9 FL (ref 8.1–13.5)
POTASSIUM SERPL-SCNC: 3.7 MMOL/L (ref 3.7–5.3)
RBC # BLD: 4.98 M/UL (ref 3.95–5.11)
RBC # BLD: ABNORMAL 10*6/UL
SEG NEUTROPHILS: 72 % (ref 36–65)
SEGMENTED NEUTROPHILS ABSOLUTE COUNT: 6.07 K/UL (ref 1.5–8.1)
SODIUM BLD-SCNC: 140 MMOL/L (ref 135–144)
TOTAL PROTEIN: 6.8 G/DL (ref 6.4–8.3)
TROPONIN INTERP: NORMAL
TROPONIN INTERP: NORMAL
TROPONIN T: NORMAL NG/ML
TROPONIN T: NORMAL NG/ML
TROPONIN, HIGH SENSITIVITY: <6 NG/L (ref 0–14)
TROPONIN, HIGH SENSITIVITY: <6 NG/L (ref 0–14)
WBC # BLD: 8.5 K/UL (ref 3.5–11.3)
WBC # BLD: ABNORMAL 10*3/UL

## 2020-07-01 PROCEDURE — 80053 COMPREHEN METABOLIC PANEL: CPT

## 2020-07-01 PROCEDURE — 96374 THER/PROPH/DIAG INJ IV PUSH: CPT

## 2020-07-01 PROCEDURE — 93005 ELECTROCARDIOGRAM TRACING: CPT | Performed by: STUDENT IN AN ORGANIZED HEALTH CARE EDUCATION/TRAINING PROGRAM

## 2020-07-01 PROCEDURE — 71046 X-RAY EXAM CHEST 2 VIEWS: CPT

## 2020-07-01 PROCEDURE — 99285 EMERGENCY DEPT VISIT HI MDM: CPT

## 2020-07-01 PROCEDURE — 83690 ASSAY OF LIPASE: CPT

## 2020-07-01 PROCEDURE — 85025 COMPLETE CBC W/AUTO DIFF WBC: CPT

## 2020-07-01 PROCEDURE — 6360000002 HC RX W HCPCS: Performed by: STUDENT IN AN ORGANIZED HEALTH CARE EDUCATION/TRAINING PROGRAM

## 2020-07-01 PROCEDURE — 84484 ASSAY OF TROPONIN QUANT: CPT

## 2020-07-01 PROCEDURE — 84703 CHORIONIC GONADOTROPIN ASSAY: CPT

## 2020-07-01 PROCEDURE — 6370000000 HC RX 637 (ALT 250 FOR IP): Performed by: STUDENT IN AN ORGANIZED HEALTH CARE EDUCATION/TRAINING PROGRAM

## 2020-07-01 RX ORDER — ASPIRIN 81 MG/1
324 TABLET, CHEWABLE ORAL ONCE
Status: COMPLETED | OUTPATIENT
Start: 2020-07-01 | End: 2020-07-01

## 2020-07-01 RX ORDER — ONDANSETRON 2 MG/ML
4 INJECTION INTRAMUSCULAR; INTRAVENOUS ONCE
Status: COMPLETED | OUTPATIENT
Start: 2020-07-01 | End: 2020-07-01

## 2020-07-01 RX ADMIN — ONDANSETRON 4 MG: 2 INJECTION INTRAMUSCULAR; INTRAVENOUS at 18:23

## 2020-07-01 RX ADMIN — ASPIRIN 81 MG 324 MG: 81 TABLET ORAL at 17:38

## 2020-07-01 ASSESSMENT — PAIN DESCRIPTION - LOCATION: LOCATION: CHEST

## 2020-07-01 ASSESSMENT — PAIN SCALES - GENERAL: PAINLEVEL_OUTOF10: 7

## 2020-07-01 ASSESSMENT — ENCOUNTER SYMPTOMS
RHINORRHEA: 0
SORE THROAT: 0
SHORTNESS OF BREATH: 1
BACK PAIN: 1
ABDOMINAL PAIN: 0
COUGH: 0
DIARRHEA: 0
EYE REDNESS: 0
VOMITING: 0
EYE ITCHING: 0

## 2020-07-01 ASSESSMENT — PAIN DESCRIPTION - FREQUENCY: FREQUENCY: CONTINUOUS

## 2020-07-01 ASSESSMENT — PAIN DESCRIPTION - ONSET: ONSET: SUDDEN

## 2020-07-01 ASSESSMENT — PAIN DESCRIPTION - DESCRIPTORS: DESCRIPTORS: DISCOMFORT

## 2020-07-01 ASSESSMENT — PAIN DESCRIPTION - PAIN TYPE: TYPE: ACUTE PAIN

## 2020-07-01 ASSESSMENT — PAIN DESCRIPTION - PROGRESSION: CLINICAL_PROGRESSION: NOT CHANGED

## 2020-07-01 NOTE — ED NOTES
Bed: 20  Expected date:   Expected time:   Means of arrival:   Comments:  [id number] Berto Leon, RN  07/01/20 1946

## 2020-07-01 NOTE — ED NOTES
[facility] ANGELIK[facility]  07/01/20 1694

## 2020-07-01 NOTE — ED NOTES
Writer met with patient at bedside. Patient is currently in custody of law enforcement. Patient is tearful stating that she has been having difficulty meeting her basic needs as a result of being layed off. Patient stated that she has not yet received her unemployment benefits and makes too much to qualify for food stamps. She reports that her two teenagers are currently with her mother, safe and have basic needs met. Patient was provided a list of food pantries and informed about benefits of utilizing 211.        LUIS MIGUEL Sotelo  07/01/20 1920

## 2020-07-01 NOTE — ED PROVIDER NOTES
Emergency Medicine Attending Note    I have seen and examined the patient in conjunction with the Resident/MLP. Please see my key portion documented:      I agree with the assessment and plan as discussed with Dr. Quan Michelle. Electronically signed:  Lady Driver M.D.           Marlon Stubbs MD  07/01/20 8647 [FreeTextEntry1] : Ms. PETERSON has recovered nicely from her surgery. She has no complaints. She is feeling well. She will return to see me on a p.r.n. basis.

## 2020-07-01 NOTE — ED PROVIDER NOTES
Handoff taken on the following patient from prior Attending Physician:    Pt Name: Torie Wu    PCP:  QUINCY Frias CNP         Attestation    I was available and discussed any additional care issues that arose and coordinated the management plans with the resident(s) caring for the patient during my duty period. Any areas of disagreement with residents documentation of care or procedures are noted on the chart. I was personally present for the key portions of any/all procedures during my duty period. I have documented in the chart those procedures where I was not present during the key portions.     Awaiting second trop low risk chest pain      Jesenia Gallagher,   07/01/20 Tushar Hancock

## 2020-07-01 NOTE — ED TRIAGE NOTES
Pt brought in by [employer] c/o midsternal chest pain radiating towards her back. Onset started 15 minutes PTA. Pt stated symptoms began after she was arrested. Denies SOB. Pt resting on stretcher, NAD noted, call light in reach.

## 2020-07-01 NOTE — ED PROVIDER NOTES
insecurity     Worry: Not on file     Inability: Not on file    Transportation needs     Medical: Not on file     Non-medical: Not on file   Tobacco Use    Smoking status: Current Every Day Smoker     Packs/day: 1.00     Years: 20.00     Pack years: 20.00     Types: Cigarettes    Smokeless tobacco: Never Used   Substance and Sexual Activity    Alcohol use: No    Drug use: Not Currently     Types: Cocaine, Marijuana     Comment: hasn't used in 2-3 years     Sexual activity: Yes     Birth control/protection: I.U.D. Lifestyle    Physical activity     Days per week: Not on file     Minutes per session: Not on file    Stress: Not on file   Relationships    Social connections     Talks on phone: Not on file     Gets together: Not on file     Attends Jain service: Not on file     Active member of club or organization: Not on file     Attends meetings of clubs or organizations: Not on file     Relationship status: Not on file    Intimate partner violence     Fear of current or ex partner: Not on file     Emotionally abused: Not on file     Physically abused: Not on file     Forced sexual activity: Not on file   Other Topics Concern    Not on file   Social History Narrative    Not on file       Family History   Problem Relation Age of Onset    Other Mother         bursitis     No Known Problems Father         unknown health history     Other Brother         COD suicide     Depression Brother     Breast Cancer Maternal Grandmother     No Known Problems Maternal Grandfather         unknown health history     No Known Problems Paternal Grandmother     No Known Problems Paternal Grandfather        Allergies:  Patient has no known allergies. Home Medications:  Prior to Admission medications    Medication Sig Start Date End Date Taking?  Authorizing Provider   levonorgestrel (MIRENA) IUD 52 mg 1 each by Intrauterine route once    Historical Provider, MD   butalbital-acetaminophen-caffeine (FIORICET, ESGIC) -40 MG per tablet Take 1 tablet by mouth every 6 hours as needed for Headaches or Migraine 4/1/20   QUINCY Cespedes CNP   gabapentin (NEURONTIN) 300 MG capsule Take 1 capsule by mouth 3 times daily for 30 days. 4/1/20 5/1/20  QUINCY Cespedes CNP   ibuprofen (ADVIL;MOTRIN) 800 MG tablet Take 1 tablet by mouth 3 times daily (with meals) 4/1/20   QUINCY Cespedes CNP       REVIEW OF SYSTEMS    (2-9 systems for level 4, 10 or more for level 5)      Review of Systems   Constitutional: Negative for chills and fever. HENT: Negative for rhinorrhea and sore throat. Eyes: Negative for redness and itching. Respiratory: Positive for shortness of breath. Negative for cough. Cardiovascular: Positive for chest pain. Gastrointestinal: Negative for abdominal pain, diarrhea and vomiting. Genitourinary: Negative for dysuria and hematuria. Musculoskeletal: Positive for back pain. Negative for neck pain. Skin: Negative for rash and wound. Allergic/Immunologic: Negative for food allergies. Neurological: Negative for weakness. PHYSICAL EXAM   (up to 7 for level 4, 8 or more for level 5)      INITIAL VITALS:   BP (!) 150/129   Pulse 92   Temp 98.1 °F (36.7 °C) (Oral)   Resp 16   Ht 5' 4\" (1.626 m)   Wt 190 lb (86.2 kg)   SpO2 97%   BMI 32.61 kg/m²     Physical Exam  Vitals signs and nursing note reviewed. Constitutional:       General: She is not in acute distress. Appearance: Normal appearance. She is not ill-appearing, toxic-appearing or diaphoretic. HENT:      Head: Normocephalic and atraumatic. Eyes:      General: No scleral icterus. Conjunctiva/sclera: Conjunctivae normal.   Neck:      Musculoskeletal: Neck supple. Cardiovascular:      Rate and Rhythm: Normal rate and regular rhythm. Pulses:           Radial pulses are 2+ on the right side and 2+ on the left side. Heart sounds: Normal heart sounds. No murmur. No friction rub. No gallop. - 3.70 k/uL    Absolute Mono # 0.76 0.10 - 1.20 k/uL    Absolute Eos # 0.09 0.00 - 0.44 k/uL    Basophils Absolute 0.05 0.00 - 0.20 k/uL    Absolute Immature Granulocyte 0.03 0.00 - 0.30 k/uL    WBC Morphology NOT REPORTED     RBC Morphology NOT REPORTED     Platelet Estimate NOT REPORTED    Comprehensive Metabolic Panel   Result Value Ref Range    Glucose 98 70 - 99 mg/dL    BUN 14 6 - 20 mg/dL    CREATININE 0.70 0.50 - 0.90 mg/dL    Bun/Cre Ratio NOT REPORTED 9 - 20    Calcium 8.6 8.6 - 10.4 mg/dL    Sodium 140 135 - 144 mmol/L    Potassium 3.7 3.7 - 5.3 mmol/L    Chloride 107 98 - 107 mmol/L    CO2 20 20 - 31 mmol/L    Anion Gap 13 9 - 17 mmol/L    Alkaline Phosphatase 71 35 - 104 U/L    ALT 9 5 - 33 U/L    AST 11 <32 U/L    Total Bilirubin 0.19 (L) 0.3 - 1.2 mg/dL    Total Protein 6.8 6.4 - 8.3 g/dL    Alb 3.9 3.5 - 5.2 g/dL    Albumin/Globulin Ratio 1.3 1.0 - 2.5    GFR Non-African American >60 >60 mL/min    GFR African American >60 >60 mL/min    GFR Comment          GFR Staging NOT REPORTED    LIPASE   Result Value Ref Range    Lipase 23 13 - 60 U/L   Troponin   Result Value Ref Range    Troponin, High Sensitivity <6 0 - 14 ng/L    Troponin T NOT REPORTED <0.03 ng/mL    Troponin Interp NOT REPORTED    HCG Qualitative, Serum   Result Value Ref Range    hCG Qual NEGATIVE NEGATIVE   Troponin   Result Value Ref Range    Troponin, High Sensitivity <6 0 - 14 ng/L    Troponin T NOT REPORTED <0.03 ng/mL    Troponin Interp NOT REPORTED      RADIOLOGY:  XR CHEST STANDARD (2 VW)   Final Result   No acute focal airspace consolidation. [facility] Rules for PE  1. Age > 50 years: No  2. HR > 100: No  3. Room air O2 sat <95%: No  4. Prior History of Venous Thromboembolism: No  5. Trauma or Surgery Within Last 4 weeks: No  6. Hemoptysis: No  7. Exogenous Estrogen: No  8. Unilateral Leg Swelling: No    If no criteria are positive and clinicians pre-test probability is <15%, PERC Rule criteria are satisfied. Probability of VTE <2%. J Thromb Haemost. 2004 Aug;2(8):1247-55. Yadira Carballo Haemost. 2008 May;6(5):772-80           EMERGENCY DEPARTMENT COURSE:    Patient evaluated by attending physician and myself. Patient presenting acute onset of chest pain after being arrested by police. On exam she is well-appearing no acute distress. Vitals unremarkable. Heart regular rate and rhythm, lungs are clear to auscultation bilateral.  Arm soft nontender. Differential diagnosis includes ACS, pneumonia, pneumothorax, PE, among others. Patient is PERC negative and thus I doubt pulmonary movement. Parents for ACS work-up, reassess. Work-up is essentially unremarkable. Patient has a low heart score. Discussed with patient she is safe for discharge but should follow-up with her primary care provider soon as possible for further testing. Strict return precautions given. Patient instructed to follow with her primary care provider as soon as possible for follow up. Patient and/or family expressed understanding and agreement with this plan. PROCEDURES:  None    CONSULTS:  None    FINAL IMPRESSION      1.  Chest pain, unspecified type          DISPOSITION / PLAN     DISPOSITION Decision To Discharge 07/01/2020 07:44:49 PM      PATIENT REFERRED TO:  Hieu Black, APRN - CNP  [address] [facility] [address]  [phone]    Schedule an appointment as soon as possible for a visit   Follow up of this visit    [facility]  [facility]  [phone]  Go to   If symptoms worsen      DISCHARGE MEDICATIONS:  Discharge Medication List as of 7/1/2020  7:45 PM          Yumiko Lloyd DO  Emergency Medicine Resident  [address] [facility]    (Please note that portions of this note were completed with a voice recognition program.  Efforts were made to edit thedictations but occasionally words are mis-transcribed.)       Yumiko Lloyd DO  Resident  07/02/20 9363

## 2020-07-02 LAB
EKG ATRIAL RATE: 92 BPM
EKG P AXIS: 67 DEGREES
EKG P-R INTERVAL: 130 MS
EKG Q-T INTERVAL: 370 MS
EKG QRS DURATION: 80 MS
EKG QTC CALCULATION (BAZETT): 457 MS
EKG R AXIS: 45 DEGREES
EKG T AXIS: 57 DEGREES
EKG VENTRICULAR RATE: 92 BPM

## 2020-07-02 PROCEDURE — 93010 ELECTROCARDIOGRAM REPORT: CPT | Performed by: INTERNAL MEDICINE

## 2020-07-30 ENCOUNTER — HOSPITAL ENCOUNTER (EMERGENCY)
Age: 35
Discharge: HOME | End: 2020-07-30
Attending: EMERGENCY MEDICINE
Payer: MEDICARE

## 2020-07-30 ENCOUNTER — APPOINTMENT (OUTPATIENT)
Dept: CT IMAGING | Age: 35
End: 2020-07-30
Payer: MEDICARE

## 2020-07-30 VITALS
OXYGEN SATURATION: 97 % | BODY MASS INDEX: 32.61 KG/M2 | HEIGHT: 64 IN | DIASTOLIC BLOOD PRESSURE: 66 MMHG | RESPIRATION RATE: 16 BRPM | HEART RATE: 111 BPM | SYSTOLIC BLOOD PRESSURE: 112 MMHG | TEMPERATURE: 98.6 F

## 2020-07-30 LAB — HCG QUALITATIVE: NEGATIVE

## 2020-07-30 PROCEDURE — 84703 CHORIONIC GONADOTROPIN ASSAY: CPT

## 2020-07-30 PROCEDURE — 99284 EMERGENCY DEPT VISIT MOD MDM: CPT

## 2020-07-30 PROCEDURE — 70486 CT MAXILLOFACIAL W/O DYE: CPT

## 2020-07-30 PROCEDURE — 12011 RPR F/E/E/N/L/M 2.5 CM/<: CPT

## 2020-07-30 PROCEDURE — 6370000000 HC RX 637 (ALT 250 FOR IP): Performed by: STUDENT IN AN ORGANIZED HEALTH CARE EDUCATION/TRAINING PROGRAM

## 2020-07-30 PROCEDURE — 6360000002 HC RX W HCPCS: Performed by: STUDENT IN AN ORGANIZED HEALTH CARE EDUCATION/TRAINING PROGRAM

## 2020-07-30 PROCEDURE — 96374 THER/PROPH/DIAG INJ IV PUSH: CPT

## 2020-07-30 PROCEDURE — 72125 CT NECK SPINE W/O DYE: CPT

## 2020-07-30 PROCEDURE — 70450 CT HEAD/BRAIN W/O DYE: CPT

## 2020-07-30 RX ORDER — IBUPROFEN 200 MG
600 TABLET ORAL EVERY 6 HOURS PRN
Qty: 60 TABLET | Refills: 0 | Status: SHIPPED | OUTPATIENT
Start: 2020-07-30 | End: 2020-08-09

## 2020-07-30 RX ORDER — AMOXICILLIN AND CLAVULANATE POTASSIUM 875; 125 MG/1; MG/1
1 TABLET, FILM COATED ORAL EVERY 12 HOURS SCHEDULED
Status: DISCONTINUED | OUTPATIENT
Start: 2020-07-30 | End: 2020-07-30

## 2020-07-30 RX ORDER — ACETAMINOPHEN 500 MG
1000 TABLET ORAL EVERY 6 HOURS PRN
Qty: 80 TABLET | Refills: 0 | Status: SHIPPED | OUTPATIENT
Start: 2020-07-30 | End: 2020-08-09

## 2020-07-30 RX ORDER — AMOXICILLIN AND CLAVULANATE POTASSIUM 875; 125 MG/1; MG/1
1 TABLET, FILM COATED ORAL 2 TIMES DAILY
Qty: 20 TABLET | Refills: 0 | Status: SHIPPED | OUTPATIENT
Start: 2020-07-30 | End: 2020-08-09

## 2020-07-30 RX ORDER — AMOXICILLIN AND CLAVULANATE POTASSIUM 875; 125 MG/1; MG/1
1 TABLET, FILM COATED ORAL ONCE
Status: COMPLETED | OUTPATIENT
Start: 2020-07-30 | End: 2020-07-30

## 2020-07-30 RX ORDER — LORATADINE AND PSEUDOEPHEDRINE SULFATE 10; 240 MG/1; MG/1
1 TABLET, EXTENDED RELEASE ORAL DAILY
Qty: 14 TABLET | Refills: 0 | Status: SHIPPED | OUTPATIENT
Start: 2020-07-30 | End: 2020-08-13

## 2020-07-30 RX ORDER — MORPHINE SULFATE 4 MG/ML
4 INJECTION, SOLUTION INTRAMUSCULAR; INTRAVENOUS ONCE
Status: COMPLETED | OUTPATIENT
Start: 2020-07-30 | End: 2020-07-30

## 2020-07-30 RX ADMIN — AMOXICILLIN AND CLAVULANATE POTASSIUM 1 TABLET: 875; 125 TABLET, FILM COATED ORAL at 05:33

## 2020-07-30 RX ADMIN — MORPHINE SULFATE 4 MG: 4 INJECTION INTRAVENOUS at 02:53

## 2020-07-30 ASSESSMENT — ENCOUNTER SYMPTOMS
FACIAL SWELLING: 1
DIARRHEA: 0
TROUBLE SWALLOWING: 0
CONSTIPATION: 0
NAUSEA: 0
ABDOMINAL PAIN: 0
SORE THROAT: 0
SINUS PAIN: 1
VOMITING: 0
SHORTNESS OF BREATH: 0

## 2020-07-30 ASSESSMENT — PAIN DESCRIPTION - FREQUENCY: FREQUENCY: CONTINUOUS

## 2020-07-30 ASSESSMENT — PAIN DESCRIPTION - PAIN TYPE: TYPE: ACUTE PAIN

## 2020-07-30 ASSESSMENT — PAIN DESCRIPTION - DESCRIPTORS: DESCRIPTORS: ACHING;CONSTANT;PRESSURE;THROBBING

## 2020-07-30 ASSESSMENT — PAIN DESCRIPTION - ONSET: ONSET: SUDDEN

## 2020-07-30 ASSESSMENT — PAIN SCALES - GENERAL
PAINLEVEL_OUTOF10: 10
PAINLEVEL_OUTOF10: 10

## 2020-07-30 ASSESSMENT — PAIN DESCRIPTION - ORIENTATION: ORIENTATION: ANTERIOR

## 2020-07-30 ASSESSMENT — PAIN - FUNCTIONAL ASSESSMENT: PAIN_FUNCTIONAL_ASSESSMENT: PREVENTS OR INTERFERES SOME ACTIVE ACTIVITIES AND ADLS

## 2020-07-30 ASSESSMENT — PAIN DESCRIPTION - LOCATION: LOCATION: HEAD

## 2020-07-30 ASSESSMENT — PAIN DESCRIPTION - PROGRESSION: CLINICAL_PROGRESSION: NOT CHANGED
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
## 2020-09-01 ENCOUNTER — HOSPITAL ENCOUNTER (EMERGENCY)
Age: 35
Discharge: LEFT BEFORE DISCHARGE | End: 2020-09-01
Attending: EMERGENCY MEDICINE
Payer: MEDICARE

## 2020-09-01 ENCOUNTER — APPOINTMENT (OUTPATIENT)
Dept: GENERAL RADIOLOGY | Age: 35
End: 2020-09-01
Payer: MEDICARE

## 2020-09-01 VITALS
OXYGEN SATURATION: 99 % | WEIGHT: 190 LBS | HEART RATE: 79 BPM | BODY MASS INDEX: 28.79 KG/M2 | RESPIRATION RATE: 15 BRPM | TEMPERATURE: 98.7 F | SYSTOLIC BLOOD PRESSURE: 119 MMHG | HEIGHT: 68 IN | DIASTOLIC BLOOD PRESSURE: 55 MMHG

## 2020-09-01 LAB
-: NORMAL
ANION GAP SERPL CALCULATED.3IONS-SCNC: 11 MMOL/L (ref 9–17)
BUN BLDV-MCNC: 17 MG/DL (ref 6–20)
BUN/CREAT BLD: ABNORMAL (ref 9–20)
CALCIUM SERPL-MCNC: 8.6 MG/DL (ref 8.6–10.4)
CHLORIDE BLD-SCNC: 109 MMOL/L (ref 98–107)
CO2: 22 MMOL/L (ref 20–31)
CREAT SERPL-MCNC: 0.64 MG/DL (ref 0.5–0.9)
GFR AFRICAN AMERICAN: >60 ML/MIN
GFR NON-AFRICAN AMERICAN: >60 ML/MIN
GFR SERPL CREATININE-BSD FRML MDRD: ABNORMAL ML/MIN/{1.73_M2}
GFR SERPL CREATININE-BSD FRML MDRD: ABNORMAL ML/MIN/{1.73_M2}
GLUCOSE BLD-MCNC: 91 MG/DL (ref 70–99)
POTASSIUM SERPL-SCNC: 3.9 MMOL/L (ref 3.7–5.3)
REASON FOR REJECTION: NORMAL
SODIUM BLD-SCNC: 142 MMOL/L (ref 135–144)
TROPONIN INTERP: NORMAL
TROPONIN T: NORMAL NG/ML
TROPONIN, HIGH SENSITIVITY: <6 NG/L (ref 0–14)
ZZ NTE CLEAN UP: ORDERED TEST: NORMAL
ZZ NTE WITH NAME CLEAN UP: SPECIMEN SOURCE: NORMAL

## 2020-09-01 PROCEDURE — 71045 X-RAY EXAM CHEST 1 VIEW: CPT

## 2020-09-01 PROCEDURE — 80048 BASIC METABOLIC PNL TOTAL CA: CPT

## 2020-09-01 PROCEDURE — 84484 ASSAY OF TROPONIN QUANT: CPT

## 2020-09-01 PROCEDURE — G0480 DRUG TEST DEF 1-7 CLASSES: HCPCS

## 2020-09-01 PROCEDURE — 84703 CHORIONIC GONADOTROPIN ASSAY: CPT

## 2020-09-01 PROCEDURE — 99285 EMERGENCY DEPT VISIT HI MDM: CPT

## 2020-09-01 PROCEDURE — 93005 ELECTROCARDIOGRAM TRACING: CPT | Performed by: STUDENT IN AN ORGANIZED HEALTH CARE EDUCATION/TRAINING PROGRAM

## 2020-09-01 PROCEDURE — 80307 DRUG TEST PRSMV CHEM ANLYZR: CPT

## 2020-09-01 PROCEDURE — 85025 COMPLETE CBC W/AUTO DIFF WBC: CPT

## 2020-09-01 RX ORDER — 0.9 % SODIUM CHLORIDE 0.9 %
1000 INTRAVENOUS SOLUTION INTRAVENOUS ONCE
Status: DISCONTINUED | OUTPATIENT
Start: 2020-09-01 | End: 2020-09-02 | Stop reason: HOSPADM

## 2020-09-01 ASSESSMENT — ENCOUNTER SYMPTOMS
PHOTOPHOBIA: 0
SORE THROAT: 0
COUGH: 0
VOMITING: 0
NAUSEA: 0
ABDOMINAL PAIN: 0
SHORTNESS OF BREATH: 1

## 2020-09-02 LAB
ACETAMINOPHEN LEVEL: <5 UG/ML (ref 10–30)
EKG ATRIAL RATE: 69 BPM
EKG P AXIS: 53 DEGREES
EKG P-R INTERVAL: 118 MS
EKG Q-T INTERVAL: 416 MS
EKG QRS DURATION: 90 MS
EKG QTC CALCULATION (BAZETT): 445 MS
EKG R AXIS: 60 DEGREES
EKG T AXIS: 65 DEGREES
EKG VENTRICULAR RATE: 69 BPM
ETHANOL PERCENT: <0.01 %
ETHANOL: <10 MG/DL
HCG QUALITATIVE: NEGATIVE
SALICYLATE LEVEL: <1 MG/DL (ref 3–10)
TOXIC TRICYCLIC SC,BLOOD: NEGATIVE

## 2020-09-02 PROCEDURE — 93010 ELECTROCARDIOGRAM REPORT: CPT | Performed by: INTERNAL MEDICINE

## 2020-09-02 NOTE — ED PROVIDER NOTES
and Motrin. No concern for pregnancy. She denies drug use and alcohol. On exam she is moving all extremities. She is tearful. GCS 15. Normal pupils. Cranial nerves intact. Neck is supple. No signs of trauma. There is mild conjunctival injection. Impression is possible new onset seizure. Plan is laboratory studies, CT imaging          Erby Dry.  Jalil Amezcua MD, [facility]  Attending Emergency  Physician               Adán Loco MD  09/01/20 [id number]       Adán Loco MD  09/01/20 1441

## 2020-09-02 NOTE — ED PROVIDER NOTES
(NEURONTIN) 300 MG capsule take 1 capsule by mouth three times a day 7/7/20 8/6/20  QUINCY Mayen CNP   levonorgestrel [facility] [place]) IUD 52 mg 1 each by Intrauterine route once    Historical Provider, MD   butalbital-acetaminophen-caffeine (FIORICET, ESGIC) -72 MG per tablet Take 1 tablet by mouth every 6 hours as needed for Headaches or Migraine 4/1/20   QUINCY Mayen CNP       REVIEW OF SYSTEMS    (2-9 systems for level 4, 10 or more for level 5)      Review of Systems   Constitutional: Negative for chills, fatigue and fever. HENT: Negative for congestion and sore throat. Eyes: Negative for photophobia and visual disturbance. Respiratory: Positive for shortness of breath. Negative for cough. Cardiovascular: Negative for chest pain and palpitations. Gastrointestinal: Negative for abdominal pain, nausea and vomiting. Genitourinary: Negative for dysuria and hematuria. Musculoskeletal: Negative for arthralgias and myalgias. Skin: Negative for rash and wound. Neurological: Positive for headaches. Negative for weakness and numbness. PHYSICAL EXAM   (up to 7 for level 4, 8 or more for level 5)      INITIAL VITALS:   BP (!) 119/55   Pulse 79   Temp 98.7 °F (37.1 °C)   Resp 15   Ht 5' 8\" (1.727 m)   Wt 190 lb (86.2 kg)   SpO2 99%   BMI 28.89 kg/m²     Physical Exam  Vitals signs and nursing note reviewed. Constitutional:       General: She is not in acute distress. Appearance: Normal appearance. She is well-developed and normal weight. She is not toxic-appearing or diaphoretic. HENT:      Head: Normocephalic and atraumatic. Right Ear: Tympanic membrane, ear canal and external ear normal.      Left Ear: Tympanic membrane, ear canal and external ear normal.      Ears:      Comments: Negative bilateral hemotympanum. Nose: Nose normal.      Comments: Negative bilateral septal hematoma.      Mouth/Throat:      Mouth: Mucous membranes are moist.   Eyes: Extraocular Movements: Extraocular movements intact. Conjunctiva/sclera: Conjunctivae normal.      Pupils: Pupils are equal, round, and reactive to light. Neck:      Musculoskeletal: Normal range of motion. No neck rigidity or muscular tenderness. Vascular: No JVD. Trachea: No tracheal deviation. Cardiovascular:      Rate and Rhythm: Normal rate and regular rhythm. Heart sounds: Normal heart sounds, S1 normal and S2 normal. No murmur. No friction rub. No gallop. Pulmonary:      Effort: Pulmonary effort is normal. No respiratory distress. Breath sounds: Normal breath sounds. No stridor. Abdominal:      General: Abdomen is flat. Bowel sounds are normal. There is no distension. Palpations: Abdomen is soft. Tenderness: There is no abdominal tenderness. There is no guarding or rebound. Musculoskeletal: Normal range of motion. General: No swelling or tenderness. Cervical back: She exhibits no bony tenderness, no swelling and no pain. Skin:     General: Skin is warm and dry. Capillary Refill: Capillary refill takes less than 2 seconds. Neurological:      General: No focal deficit present. Mental Status: She is alert and oriented to person, place, and time. Cranial Nerves: No cranial nerve deficit. Sensory: No sensory deficit. Motor: No abnormal muscle tone. Comments: 5/5 strength in all extremities.          DIFFERENTIAL  DIAGNOSIS     PLAN (LABS / IMAGING / EKG):  Orders Placed This Encounter   Procedures    XR CHEST PORTABLE    Basic Metabolic Panel w/ Reflex to MG    Troponin    Urine Drug Screen    SPECIMEN REJECTION    PREVIOUS SPECIMEN    TOX SCR, BLD, ED    HCG Qualitative, Serum    EKG 12 Lead    EKG REPORT    Insert peripheral IV       MEDICATIONS ORDERED:  Orders Placed This Encounter   Medications    DISCONTD: 0.9 % sodium chloride bolus       DDX: Seizure, [place] abnormality, pregnancy, musculoskeletal pain, pneumothorax, bronchitis, anxiety, arrhythmia, EtOH intoxication    DIAGNOSTIC RESULTS / EMERGENCY DEPARTMENT COURSE / MDM   LAB RESULTS:  Results for orders placed or performed during the hospital encounter of 29/28/58   Basic Metabolic Panel w/ Reflex to MG   Result Value Ref Range    Glucose 91 70 - 99 mg/dL    BUN 17 6 - 20 mg/dL    CREATININE 0.64 0.50 - 0.90 mg/dL    Bun/Cre Ratio NOT REPORTED 9 - 20    Calcium 8.6 8.6 - 10.4 mg/dL    Sodium 142 135 - 144 mmol/L    Potassium 3.9 3.7 - 5.3 mmol/L    Chloride 109 (H) 98 - 107 mmol/L    CO2 22 20 - 31 mmol/L    Anion Gap 11 9 - 17 mmol/L    GFR Non-African American >60 >60 mL/min    GFR African American >60 >60 mL/min    GFR Comment          GFR Staging NOT REPORTED    Troponin   Result Value Ref Range    Troponin, High Sensitivity <6 0 - 14 ng/L    Troponin T NOT REPORTED <0.03 ng/mL    Troponin Interp NOT REPORTED    SPECIMEN REJECTION   Result Value Ref Range    Specimen Source ER     Ordered Test CDP,XBLUE     Reason for Rejection Unable to perform testing: Specimen clotted. - NOT REPORTED    TOX SCR, BLD, ED   Result Value Ref Range    Acetaminophen Level <5 (L) 10 - 30 ug/mL    Ethanol <10 <10 mg/dL    Ethanol percent <3.053 <3.497 %    Salicylate Lvl <1 (L) 3 - 10 mg/dL    Toxic Tricyclic Sc,Blood NEGATIVE NEGATIVE   HCG Qualitative, Serum   Result Value Ref Range    hCG Qual NEGATIVE NEGATIVE   EKG 12 Lead   Result Value Ref Range    Ventricular Rate 69 BPM    Atrial Rate 69 BPM    P-R Interval 118 ms    QRS Duration 90 ms    Q-T Interval 416 ms    QTc Calculation (Bazett) 445 ms    P Axis 53 degrees    R Axis 60 degrees    T Axis 65 degrees       IMPRESSION: 21-year-old female presents for possible seizure-like activity. Patient was in acute distress nontoxic-appearing. Patient's initial vitals are stable and not concerning. Patient has no tenderness to palpation with clear lung sounds bilaterally. Cervical spine is nontender to palpation. Patient is alert and oriented person place and time moving all extremities appropriately with no focal neuro deficits. Concern for above differential diagnosis. Will order CBC, BMP, EKG, chest x-ray, ED tox, and serum hCG. RADIOLOGY:  Xr Chest Portable    Result Date: 2020  EXAMINATION: ONE XRAY VIEW OF THE CHEST 2020 11:19 pm COMPARISON: 2020 HISTORY: ORDERING SYSTEM PROVIDED HISTORY: shortness of breath TECHNOLOGIST PROVIDED HISTORY: shortness of breath Reason for Exam: sob FINDINGS: The lungs are without acute focal process. There is no effusion or pneumothorax. The cardiomediastinal silhouette is without acute process. The osseous structures are without acute process. No acute process. Hardware noted in the lumbar spine. EKG  EKG Interpretation    Interpreted by emergency department physician    Rhythm: normal sinus   Rate: normal  Axis: normal  Ectopy: none  Conduction: normal  ST Segments: normal  T Waves: inversion in  v2  Q Waves: none    Clinical Impression: no acute changes from prior EKG on 2020. Elver Maxwell      All EKG's are interpreted by the Emergency Department Physician who either signs or Co-signs this chart in the absence of a cardiologist.    EMERGENCY DEPARTMENT COURSE:  ED Course as of Sep 02 0202   Tue Sep 01, 2020   2346 Patient eloped before she could be further evaluated. [CS]      ED Course User Index  [CS] Elver Maxwell DO         PROCEDURES:  none    CONSULTS:  None    CRITICAL CARE:  Please see attending note    FINAL IMPRESSION      1. Seizure-like activity ([facility] [address]          DISPOSITION / PLAN     DISPOSITION    Eloped    PATIENT REFERRED TO:  No follow-up provider specified.     DISCHARGE MEDICATIONS:  Discharge Medication List as of 2020 11:48 PM          Elver Maxwell DO  Emergency Medicine Resident    (Please note that portions of thisnote were completed with a voice recognition program.  Efforts were made to edit the dictations but occasionally words are mis-transcribed.)       Jose L Cue, DO  Resident  09/03/20 2038

## 2020-09-13 ENCOUNTER — APPOINTMENT (OUTPATIENT)
Dept: CT IMAGING | Age: 35
End: 2020-09-13
Payer: MEDICARE

## 2020-09-13 ENCOUNTER — HOSPITAL ENCOUNTER (EMERGENCY)
Age: 35
Discharge: LEFT BEFORE DISCHARGE | End: 2020-09-13
Attending: EMERGENCY MEDICINE
Payer: MEDICARE

## 2020-09-13 VITALS
OXYGEN SATURATION: 98 % | RESPIRATION RATE: 21 BRPM | SYSTOLIC BLOOD PRESSURE: 129 MMHG | WEIGHT: 190 LBS | HEART RATE: 82 BPM | TEMPERATURE: 98.2 F | BODY MASS INDEX: 28.79 KG/M2 | HEIGHT: 68 IN | DIASTOLIC BLOOD PRESSURE: 57 MMHG

## 2020-09-13 LAB
ABSOLUTE EOS #: 0.12 K/UL (ref 0–0.44)
ABSOLUTE IMMATURE GRANULOCYTE: 0.06 K/UL (ref 0–0.3)
ABSOLUTE LYMPH #: 1.85 K/UL (ref 1.1–3.7)
ABSOLUTE MONO #: 0.87 K/UL (ref 0.1–1.2)
ALBUMIN SERPL-MCNC: 4.1 G/DL (ref 3.5–5.2)
ALBUMIN/GLOBULIN RATIO: NORMAL (ref 1–2.5)
ALP BLD-CCNC: 64 U/L (ref 35–104)
ALT SERPL-CCNC: 12 U/L (ref 5–33)
ANION GAP SERPL CALCULATED.3IONS-SCNC: 11 MMOL/L (ref 9–17)
AST SERPL-CCNC: 19 U/L
BASOPHILS # BLD: 1 % (ref 0–2)
BASOPHILS ABSOLUTE: 0.06 K/UL (ref 0–0.2)
BILIRUB SERPL-MCNC: 0.33 MG/DL (ref 0.3–1.2)
BILIRUBIN DIRECT: 0.09 MG/DL
BILIRUBIN, INDIRECT: 0.24 MG/DL (ref 0–1)
BUN BLDV-MCNC: 15 MG/DL (ref 6–20)
BUN/CREAT BLD: 21 (ref 9–20)
CALCIUM SERPL-MCNC: 8.7 MG/DL (ref 8.6–10.4)
CHLORIDE BLD-SCNC: 110 MMOL/L (ref 98–107)
CO2: 21 MMOL/L (ref 20–31)
CREAT SERPL-MCNC: 0.72 MG/DL (ref 0.5–0.9)
DIFFERENTIAL TYPE: ABNORMAL
EOSINOPHILS RELATIVE PERCENT: 1 % (ref 1–4)
ETHANOL PERCENT: <0.01 %
ETHANOL: <10 MG/DL
GFR AFRICAN AMERICAN: >60 ML/MIN
GFR NON-AFRICAN AMERICAN: >60 ML/MIN
GFR SERPL CREATININE-BSD FRML MDRD: ABNORMAL ML/MIN/{1.73_M2}
GFR SERPL CREATININE-BSD FRML MDRD: ABNORMAL ML/MIN/{1.73_M2}
GLOBULIN: NORMAL G/DL (ref 1.5–3.8)
GLUCOSE BLD-MCNC: 98 MG/DL (ref 70–99)
HCG QUALITATIVE: NEGATIVE
HCT VFR BLD CALC: 44.7 % (ref 36.3–47.1)
HEMOGLOBIN: 14.7 G/DL (ref 11.9–15.1)
IMMATURE GRANULOCYTES: 1 %
LACTIC ACID: 1.5 MMOL/L (ref 0.5–2.2)
LYMPHOCYTES # BLD: 17 % (ref 24–43)
MAGNESIUM: 2.1 MG/DL (ref 1.6–2.6)
MCH RBC QN AUTO: 33.3 PG (ref 25.2–33.5)
MCHC RBC AUTO-ENTMCNC: 32.9 G/DL (ref 28.4–34.8)
MCV RBC AUTO: 101.4 FL (ref 82.6–102.9)
MONOCYTES # BLD: 8 % (ref 3–12)
NRBC AUTOMATED: 0 PER 100 WBC
PDW BLD-RTO: 13.2 % (ref 11.8–14.4)
PLATELET # BLD: 250 K/UL (ref 138–453)
PLATELET ESTIMATE: ABNORMAL
PMV BLD AUTO: 10 FL (ref 8.1–13.5)
POTASSIUM SERPL-SCNC: 3.5 MMOL/L (ref 3.7–5.3)
RBC # BLD: 4.41 M/UL (ref 3.95–5.11)
RBC # BLD: ABNORMAL 10*6/UL
SEG NEUTROPHILS: 72 % (ref 36–65)
SEGMENTED NEUTROPHILS ABSOLUTE COUNT: 8.15 K/UL (ref 1.5–8.1)
SODIUM BLD-SCNC: 142 MMOL/L (ref 135–144)
TOTAL PROTEIN: 6.6 G/DL (ref 6.4–8.3)
WBC # BLD: 11.1 K/UL (ref 3.5–11.3)
WBC # BLD: ABNORMAL 10*3/UL

## 2020-09-13 PROCEDURE — 93005 ELECTROCARDIOGRAM TRACING: CPT | Performed by: EMERGENCY MEDICINE

## 2020-09-13 PROCEDURE — 85025 COMPLETE CBC W/AUTO DIFF WBC: CPT

## 2020-09-13 PROCEDURE — 70450 CT HEAD/BRAIN W/O DYE: CPT

## 2020-09-13 PROCEDURE — 80307 DRUG TEST PRSMV CHEM ANLYZR: CPT

## 2020-09-13 PROCEDURE — 83735 ASSAY OF MAGNESIUM: CPT

## 2020-09-13 PROCEDURE — G0480 DRUG TEST DEF 1-7 CLASSES: HCPCS

## 2020-09-13 PROCEDURE — 4500000002 HC ER NO CHARGE

## 2020-09-13 PROCEDURE — 80076 HEPATIC FUNCTION PANEL: CPT

## 2020-09-13 PROCEDURE — 83605 ASSAY OF LACTIC ACID: CPT

## 2020-09-13 PROCEDURE — 84703 CHORIONIC GONADOTROPIN ASSAY: CPT

## 2020-09-13 PROCEDURE — 80048 BASIC METABOLIC PNL TOTAL CA: CPT

## 2020-09-14 ENCOUNTER — HOSPITAL ENCOUNTER (EMERGENCY)
Age: 35
Discharge: HOME | End: 2020-09-14
Attending: EMERGENCY MEDICINE
Payer: MEDICARE

## 2020-09-14 ENCOUNTER — APPOINTMENT (OUTPATIENT)
Dept: GENERAL RADIOLOGY | Age: 35
End: 2020-09-14
Payer: MEDICARE

## 2020-09-14 VITALS
TEMPERATURE: 98.2 F | OXYGEN SATURATION: 96 % | WEIGHT: 190 LBS | HEIGHT: 64 IN | RESPIRATION RATE: 14 BRPM | BODY MASS INDEX: 32.44 KG/M2 | HEART RATE: 78 BPM

## 2020-09-14 LAB
ABSOLUTE EOS #: 0.12 K/UL (ref 0–0.44)
ABSOLUTE IMMATURE GRANULOCYTE: 0.04 K/UL (ref 0–0.3)
ABSOLUTE LYMPH #: 1.68 K/UL (ref 1.1–3.7)
ABSOLUTE MONO #: 0.64 K/UL (ref 0.1–1.2)
ACETAMINOPHEN LEVEL: <5 UG/ML (ref 10–30)
ANION GAP SERPL CALCULATED.3IONS-SCNC: 12 MMOL/L (ref 9–17)
BASOPHILS # BLD: 1 % (ref 0–2)
BASOPHILS ABSOLUTE: 0.05 K/UL (ref 0–0.2)
BUN BLDV-MCNC: 19 MG/DL (ref 6–20)
BUN/CREAT BLD: NORMAL (ref 9–20)
CALCIUM SERPL-MCNC: 9.1 MG/DL (ref 8.6–10.4)
CHLORIDE BLD-SCNC: 106 MMOL/L (ref 98–107)
CO2: 20 MMOL/L (ref 20–31)
CREAT SERPL-MCNC: 0.54 MG/DL (ref 0.5–0.9)
DIFFERENTIAL TYPE: ABNORMAL
EKG ATRIAL RATE: 96 BPM
EKG P AXIS: 74 DEGREES
EKG P-R INTERVAL: 126 MS
EKG Q-T INTERVAL: 372 MS
EKG QRS DURATION: 86 MS
EKG QTC CALCULATION (BAZETT): 469 MS
EKG R AXIS: 65 DEGREES
EKG T AXIS: 78 DEGREES
EKG VENTRICULAR RATE: 96 BPM
EOSINOPHILS RELATIVE PERCENT: 1 % (ref 1–4)
ETHANOL PERCENT: <0.01 %
ETHANOL: <10 MG/DL
GFR AFRICAN AMERICAN: >60 ML/MIN
GFR NON-AFRICAN AMERICAN: >60 ML/MIN
GFR SERPL CREATININE-BSD FRML MDRD: NORMAL ML/MIN/{1.73_M2}
GFR SERPL CREATININE-BSD FRML MDRD: NORMAL ML/MIN/{1.73_M2}
GLUCOSE BLD-MCNC: 86 MG/DL (ref 70–99)
HCG QUALITATIVE: NEGATIVE
HCT VFR BLD CALC: 48.1 % (ref 36.3–47.1)
HEMOGLOBIN: 15.5 G/DL (ref 11.9–15.1)
IMMATURE GRANULOCYTES: 0 %
LYMPHOCYTES # BLD: 17 % (ref 24–43)
MCH RBC QN AUTO: 33 PG (ref 25.2–33.5)
MCHC RBC AUTO-ENTMCNC: 32.2 G/DL (ref 28.4–34.8)
MCV RBC AUTO: 102.3 FL (ref 82.6–102.9)
MONOCYTES # BLD: 6 % (ref 3–12)
NRBC AUTOMATED: 0 PER 100 WBC
PDW BLD-RTO: 13.3 % (ref 11.8–14.4)
PLATELET # BLD: 271 K/UL (ref 138–453)
PLATELET ESTIMATE: ABNORMAL
PMV BLD AUTO: 10.7 FL (ref 8.1–13.5)
POTASSIUM SERPL-SCNC: 4.1 MMOL/L (ref 3.7–5.3)
RBC # BLD: 4.7 M/UL (ref 3.95–5.11)
RBC # BLD: ABNORMAL 10*6/UL
SALICYLATE LEVEL: <1 MG/DL (ref 3–10)
SEG NEUTROPHILS: 75 % (ref 36–65)
SEGMENTED NEUTROPHILS ABSOLUTE COUNT: 7.42 K/UL (ref 1.5–8.1)
SODIUM BLD-SCNC: 138 MMOL/L (ref 135–144)
TOXIC TRICYCLIC SC,BLOOD: NEGATIVE
TROPONIN INTERP: NORMAL
TROPONIN T: NORMAL NG/ML
TROPONIN, HIGH SENSITIVITY: <6 NG/L (ref 0–14)
WBC # BLD: 10 K/UL (ref 3.5–11.3)
WBC # BLD: ABNORMAL 10*3/UL

## 2020-09-14 PROCEDURE — 93005 ELECTROCARDIOGRAM TRACING: CPT | Performed by: EMERGENCY MEDICINE

## 2020-09-14 PROCEDURE — 84484 ASSAY OF TROPONIN QUANT: CPT

## 2020-09-14 PROCEDURE — 80048 BASIC METABOLIC PNL TOTAL CA: CPT

## 2020-09-14 PROCEDURE — 99284 EMERGENCY DEPT VISIT MOD MDM: CPT

## 2020-09-14 PROCEDURE — 80307 DRUG TEST PRSMV CHEM ANLYZR: CPT

## 2020-09-14 PROCEDURE — 85025 COMPLETE CBC W/AUTO DIFF WBC: CPT

## 2020-09-14 PROCEDURE — 84703 CHORIONIC GONADOTROPIN ASSAY: CPT

## 2020-09-14 PROCEDURE — G0480 DRUG TEST DEF 1-7 CLASSES: HCPCS

## 2020-09-14 PROCEDURE — 71045 X-RAY EXAM CHEST 1 VIEW: CPT

## 2020-09-14 ASSESSMENT — ENCOUNTER SYMPTOMS
BACK PAIN: 0
NAUSEA: 0
DIARRHEA: 0
VOMITING: 0
SHORTNESS OF BREATH: 0
COLOR CHANGE: 0
ABDOMINAL PAIN: 0
FACIAL SWELLING: 0
APNEA: 0
CHEST TIGHTNESS: 1
CONSTIPATION: 0
ABDOMINAL DISTENTION: 0
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
## 2020-09-14 NOTE — ED NOTES
Bed: 43  Expected date:   Expected time:   Means of arrival:   Comments:  [address], [address] [facility]  09/14/20 0652
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
## 2020-09-14 NOTE — ED NOTES
Pt began to have seizure like activity. Dr. Jackson Grace notified. Pt maintaining own airway and maintaining sats at this time. Dr. Jackson Grace performed sternal rub on patient and patient immediately wakes up alert and oriented.      Lori Moreno RN  09/14/20 5425

## 2020-09-16 LAB
EKG ATRIAL RATE: 70 BPM
EKG P AXIS: 32 DEGREES
EKG P-R INTERVAL: 124 MS
EKG Q-T INTERVAL: 414 MS
EKG QRS DURATION: 86 MS
EKG QTC CALCULATION (BAZETT): 447 MS
EKG R AXIS: 81 DEGREES
EKG T AXIS: 57 DEGREES
EKG VENTRICULAR RATE: 70 BPM

## 2020-09-16 PROCEDURE — 93010 ELECTROCARDIOGRAM REPORT: CPT | Performed by: INTERNAL MEDICINE

## 2020-10-14 ENCOUNTER — TELEPHONE (OUTPATIENT)
Dept: ADMINISTRATIVE | Age: 35
End: 2020-10-14

## 2020-10-14 DIAGNOSIS — Z87.81 HISTORY OF BROKEN NOSE: Primary | ICD-10-CM

## 2020-10-27 ENCOUNTER — HOSPITAL ENCOUNTER (EMERGENCY)
Age: 35
Discharge: HOME | End: 2020-10-27
Attending: EMERGENCY MEDICINE
Payer: MEDICARE

## 2020-10-27 VITALS
HEART RATE: 75 BPM | OXYGEN SATURATION: 100 % | RESPIRATION RATE: 14 BRPM | DIASTOLIC BLOOD PRESSURE: 83 MMHG | SYSTOLIC BLOOD PRESSURE: 125 MMHG | TEMPERATURE: 98.5 F

## 2020-10-27 LAB
-: ABNORMAL
ABSOLUTE EOS #: 0.06 K/UL (ref 0–0.44)
ABSOLUTE IMMATURE GRANULOCYTE: 0.04 K/UL (ref 0–0.3)
ABSOLUTE LYMPH #: 1.18 K/UL (ref 1.1–3.7)
ABSOLUTE MONO #: 0.59 K/UL (ref 0.1–1.2)
ACETAMINOPHEN LEVEL: <5 UG/ML (ref 10–30)
AMMONIA: 53 UMOL/L (ref 11–51)
AMORPHOUS: ABNORMAL
ANION GAP SERPL CALCULATED.3IONS-SCNC: 11 MMOL/L (ref 9–17)
BACTERIA: ABNORMAL
BASOPHILS # BLD: 1 % (ref 0–2)
BASOPHILS ABSOLUTE: 0.04 K/UL (ref 0–0.2)
BILIRUBIN URINE: NEGATIVE
BUN BLDV-MCNC: 18 MG/DL (ref 6–20)
BUN/CREAT BLD: ABNORMAL (ref 9–20)
CALCIUM SERPL-MCNC: 8.9 MG/DL (ref 8.6–10.4)
CASTS UA: ABNORMAL /LPF (ref 0–8)
CHLORIDE BLD-SCNC: 109 MMOL/L (ref 98–107)
CO2: 19 MMOL/L (ref 20–31)
COLOR: YELLOW
CREAT SERPL-MCNC: 0.61 MG/DL (ref 0.5–0.9)
CRYSTALS, UA: ABNORMAL /HPF
DIFFERENTIAL TYPE: ABNORMAL
EOSINOPHILS RELATIVE PERCENT: 1 % (ref 1–4)
EPITHELIAL CELLS UA: ABNORMAL /HPF (ref 0–5)
ETHANOL PERCENT: <0.01 %
ETHANOL: <10 MG/DL
GFR AFRICAN AMERICAN: >60 ML/MIN
GFR NON-AFRICAN AMERICAN: >60 ML/MIN
GFR SERPL CREATININE-BSD FRML MDRD: ABNORMAL ML/MIN/{1.73_M2}
GFR SERPL CREATININE-BSD FRML MDRD: ABNORMAL ML/MIN/{1.73_M2}
GLUCOSE BLD-MCNC: 89 MG/DL (ref 70–99)
GLUCOSE URINE: NEGATIVE
HCT VFR BLD CALC: 48.4 % (ref 36.3–47.1)
HEMOGLOBIN: 15.7 G/DL (ref 11.9–15.1)
IMMATURE GRANULOCYTES: 1 %
KETONES, URINE: ABNORMAL
LEUKOCYTE ESTERASE, URINE: NEGATIVE
LYMPHOCYTES # BLD: 14 % (ref 24–43)
MCH RBC QN AUTO: 33.4 PG (ref 25.2–33.5)
MCHC RBC AUTO-ENTMCNC: 32.4 G/DL (ref 28.4–34.8)
MCV RBC AUTO: 103 FL (ref 82.6–102.9)
MONOCYTES # BLD: 7 % (ref 3–12)
MUCUS: ABNORMAL
NITRITE, URINE: NEGATIVE
NRBC AUTOMATED: 0 PER 100 WBC
OTHER OBSERVATIONS UA: ABNORMAL
PDW BLD-RTO: 12.8 % (ref 11.8–14.4)
PH UA: 5 (ref 5–8)
PLATELET # BLD: 244 K/UL (ref 138–453)
PLATELET ESTIMATE: ABNORMAL
PMV BLD AUTO: 10.6 FL (ref 8.1–13.5)
POTASSIUM SERPL-SCNC: 3.9 MMOL/L (ref 3.7–5.3)
PROLACTIN: 7.17 UG/L (ref 4.79–23.3)
PROTEIN UA: NEGATIVE
RBC # BLD: 4.7 M/UL (ref 3.95–5.11)
RBC # BLD: ABNORMAL 10*6/UL
RBC UA: ABNORMAL /HPF (ref 0–4)
RENAL EPITHELIAL, UA: ABNORMAL /HPF
SALICYLATE LEVEL: <1 MG/DL (ref 3–10)
SEG NEUTROPHILS: 76 % (ref 36–65)
SEGMENTED NEUTROPHILS ABSOLUTE COUNT: 6.77 K/UL (ref 1.5–8.1)
SODIUM BLD-SCNC: 139 MMOL/L (ref 135–144)
SPECIFIC GRAVITY UA: 1.03 (ref 1–1.03)
TOXIC TRICYCLIC SC,BLOOD: NEGATIVE
TRICHOMONAS: ABNORMAL
TROPONIN INTERP: NORMAL
TROPONIN T: NORMAL NG/ML
TROPONIN, HIGH SENSITIVITY: <6 NG/L (ref 0–14)
TURBIDITY: CLEAR
URINE HGB: ABNORMAL
UROBILINOGEN, URINE: NORMAL
WBC # BLD: 8.7 K/UL (ref 3.5–11.3)
WBC # BLD: ABNORMAL 10*3/UL
WBC UA: ABNORMAL /HPF (ref 0–5)
YEAST: ABNORMAL

## 2020-10-27 PROCEDURE — 84146 ASSAY OF PROLACTIN: CPT

## 2020-10-27 PROCEDURE — 80307 DRUG TEST PRSMV CHEM ANLYZR: CPT

## 2020-10-27 PROCEDURE — 93005 ELECTROCARDIOGRAM TRACING: CPT | Performed by: STUDENT IN AN ORGANIZED HEALTH CARE EDUCATION/TRAINING PROGRAM

## 2020-10-27 PROCEDURE — 80048 BASIC METABOLIC PNL TOTAL CA: CPT

## 2020-10-27 PROCEDURE — 2580000003 HC RX 258: Performed by: STUDENT IN AN ORGANIZED HEALTH CARE EDUCATION/TRAINING PROGRAM

## 2020-10-27 PROCEDURE — 99284 EMERGENCY DEPT VISIT MOD MDM: CPT

## 2020-10-27 PROCEDURE — G0480 DRUG TEST DEF 1-7 CLASSES: HCPCS

## 2020-10-27 PROCEDURE — 81001 URINALYSIS AUTO W/SCOPE: CPT

## 2020-10-27 PROCEDURE — 84484 ASSAY OF TROPONIN QUANT: CPT

## 2020-10-27 PROCEDURE — 82140 ASSAY OF AMMONIA: CPT

## 2020-10-27 PROCEDURE — 85025 COMPLETE CBC W/AUTO DIFF WBC: CPT

## 2020-10-27 PROCEDURE — 6370000000 HC RX 637 (ALT 250 FOR IP): Performed by: STUDENT IN AN ORGANIZED HEALTH CARE EDUCATION/TRAINING PROGRAM

## 2020-10-27 RX ORDER — AMMONIA INHALANTS 0.04 G/.3ML
INHALANT RESPIRATORY (INHALATION)
Status: DISCONTINUED
Start: 2020-10-27 | End: 2020-10-27 | Stop reason: HOSPADM

## 2020-10-27 RX ORDER — NALOXONE HYDROCHLORIDE 1 MG/ML
INJECTION INTRAMUSCULAR; INTRAVENOUS; SUBCUTANEOUS
Status: DISCONTINUED
Start: 2020-10-27 | End: 2020-10-27 | Stop reason: WASHOUT

## 2020-10-27 RX ORDER — 0.9 % SODIUM CHLORIDE 0.9 %
1000 INTRAVENOUS SOLUTION INTRAVENOUS ONCE
Status: COMPLETED | OUTPATIENT
Start: 2020-10-27 | End: 2020-10-27

## 2020-10-27 RX ORDER — IBUPROFEN 800 MG/1
800 TABLET ORAL ONCE
Status: COMPLETED | OUTPATIENT
Start: 2020-10-27 | End: 2020-10-27

## 2020-10-27 RX ORDER — ACETAMINOPHEN 500 MG
1000 TABLET ORAL ONCE
Status: COMPLETED | OUTPATIENT
Start: 2020-10-27 | End: 2020-10-27

## 2020-10-27 RX ADMIN — ACETAMINOPHEN 1000 MG: 500 TABLET ORAL at 14:43

## 2020-10-27 RX ADMIN — IBUPROFEN 800 MG: 800 TABLET, FILM COATED ORAL at 14:43

## 2020-10-27 RX ADMIN — SODIUM CHLORIDE 1000 ML: 9 INJECTION, SOLUTION INTRAVENOUS at 12:45

## 2020-10-27 ASSESSMENT — PAIN SCALES - GENERAL: PAINLEVEL_OUTOF10: 10

## 2020-10-27 NOTE — ED NOTES
Bed: 29  Expected date:   Expected time:   Means of arrival:   Comments:  DEVAN [id number] Luis Alfredo Pepe, RN  10/27/20 6871

## 2020-10-27 NOTE — ED TRIAGE NOTES
Patient was in [employer] custody and was told she was going to [facility]. Patient immediately started seizing. Patient un responsive to sternal rub upon arrival, but reacted to ammonia salts.

## 2020-10-27 NOTE — ED PROVIDER NOTES
[facility]     Emergency Department     Faculty Attestation    I performed a history and physical examination of the patient and discussed management with the resident. I reviewed the residents note and agree with the documented findings and plan of care. Any areas of disagreement are noted on the chart. I was personally present for the key portions of any procedures. I have documented in the chart those procedures where I was not present during the key portions. I have reviewed the emergency nurses triage note. I agree with the chief complaint, past medical history, past surgical history, allergies, medications, social and family history as documented unless otherwise noted below. For Physician Assistant/ Nurse Practitioner cases/documentation I have personally evaluated this patient and have completed at least one if not all key elements of the E/M (history, physical exam, and MDM). Additional findings are as noted. Primary Care Physician:  QUINCY Alvarado [id number]       Chief Complaint   Patient presents with    Seizures     on way to custodial       RECENT VITALS:   Temp: 98.5 °F (36.9 °C),  Pulse: 75, Resp: 14, BP: 125/83    LABS:  Labs Reviewed   CBC WITH AUTO DIFFERENTIAL   BASIC METABOLIC PANEL W/ REFLEX TO MG FOR LOW K   DRUGS OF ABUSE 9 SERUM W/ REFLEX   TOX SCR, BLD, ED   AMMONIA   PROLACTIN   URINALYSIS WITH MICROSCOPIC     EKG shows a normal sinus rhythm at 83 bpm IN interval is 130 ms QS durations 88 ms QT corrected 402 ms axis is 59 there is no acute ST changes    PERTINENT ATTENDING PHYSICIAN COMMENTS:    Patient presents with seizures on the way to custodial she has a history of pseudoseizures actually seen this morning at [facility] same thing after being arrested by with her ER negative CAT scan while she was there after the police left she eloped.   This she came in here unresponsive but responded to ammonia salts she claims a headache and chest pain and
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
## 2020-10-28 LAB
EKG ATRIAL RATE: 83 BPM
EKG P AXIS: 10 DEGREES
EKG P-R INTERVAL: 130 MS
EKG Q-T INTERVAL: 394 MS
EKG QRS DURATION: 88 MS
EKG QTC CALCULATION (BAZETT): 462 MS
EKG R AXIS: 59 DEGREES
EKG T AXIS: 51 DEGREES
EKG VENTRICULAR RATE: 83 BPM

## 2020-10-28 PROCEDURE — 93010 ELECTROCARDIOGRAM REPORT: CPT | Performed by: INTERNAL MEDICINE

## 2020-10-29 LAB
AMPHETAMINE: NEGATIVE NG/ML
BARBITURATES: NEGATIVE NG/ML
BENZODIAZEPINES: NEGATIVE NG/ML
BUPRENORPHINE: NEGATIVE NG/ML
COCAINE: POSITIVE NG/ML
DRUGS OF ABUSE COMMENT: NORMAL
METHADONE: NEGATIVE NG/ML
METHAMPHETAMINE: NEGATIVE NG/ML
OPIATES: NEGATIVE NG/ML
OXYCODONE: NEGATIVE NG/ML
PHENCYCLIDINE: NEGATIVE NG/ML
THC: NEGATIVE NG/ML

## 2020-10-31 LAB — COCAINE BLOOD: 734 NG/ML

## 2021-01-30 ENCOUNTER — HOSPITAL ENCOUNTER (EMERGENCY)
Age: 36
Discharge: HOME | End: 2021-01-30
Attending: EMERGENCY MEDICINE
Payer: MEDICARE

## 2021-01-30 ENCOUNTER — APPOINTMENT (OUTPATIENT)
Dept: CT IMAGING | Age: 36
End: 2021-01-30
Payer: MEDICARE

## 2021-01-30 ENCOUNTER — APPOINTMENT (OUTPATIENT)
Dept: GENERAL RADIOLOGY | Age: 36
End: 2021-01-30
Payer: MEDICARE

## 2021-01-30 VITALS
OXYGEN SATURATION: 100 % | HEART RATE: 72 BPM | RESPIRATION RATE: 16 BRPM | TEMPERATURE: 98.4 F | DIASTOLIC BLOOD PRESSURE: 76 MMHG | HEIGHT: 67 IN | WEIGHT: 198 LBS | SYSTOLIC BLOOD PRESSURE: 118 MMHG | BODY MASS INDEX: 31.08 KG/M2

## 2021-01-30 DIAGNOSIS — R56.9 CONVULSIONS, UNSPECIFIED CONVULSION TYPE (HCC): Primary | ICD-10-CM

## 2021-01-30 DIAGNOSIS — R56.9 NONEPILEPTIC EPISODE (HCC): ICD-10-CM

## 2021-01-30 LAB
ABSOLUTE EOS #: 0.1 K/UL (ref 0–0.4)
ABSOLUTE IMMATURE GRANULOCYTE: ABNORMAL K/UL (ref 0–0.3)
ABSOLUTE LYMPH #: 1 K/UL (ref 1–4.8)
ABSOLUTE MONO #: 0.6 K/UL (ref 0.1–1.2)
ALBUMIN SERPL-MCNC: 3.8 G/DL (ref 3.5–5.2)
ALBUMIN/GLOBULIN RATIO: 1.2 (ref 1–2.5)
ALP BLD-CCNC: 65 U/L (ref 35–104)
ALT SERPL-CCNC: 13 U/L (ref 5–33)
ANION GAP SERPL CALCULATED.3IONS-SCNC: 8 MMOL/L (ref 9–17)
AST SERPL-CCNC: 21 U/L
BASOPHILS # BLD: 1 % (ref 0–2)
BASOPHILS ABSOLUTE: 0 K/UL (ref 0–0.2)
BILIRUB SERPL-MCNC: 0.25 MG/DL (ref 0.3–1.2)
BUN BLDV-MCNC: 18 MG/DL (ref 6–20)
BUN/CREAT BLD: ABNORMAL (ref 9–20)
CALCIUM SERPL-MCNC: 8.8 MG/DL (ref 8.6–10.4)
CHLORIDE BLD-SCNC: 109 MMOL/L (ref 98–107)
CO2: 21 MMOL/L (ref 20–31)
CREAT SERPL-MCNC: 0.68 MG/DL (ref 0.5–0.9)
DIFFERENTIAL TYPE: ABNORMAL
EOSINOPHILS RELATIVE PERCENT: 1 % (ref 1–4)
GFR AFRICAN AMERICAN: >60 ML/MIN
GFR NON-AFRICAN AMERICAN: >60 ML/MIN
GFR SERPL CREATININE-BSD FRML MDRD: ABNORMAL ML/MIN/{1.73_M2}
GFR SERPL CREATININE-BSD FRML MDRD: ABNORMAL ML/MIN/{1.73_M2}
GLUCOSE BLD-MCNC: 97 MG/DL (ref 70–99)
HCG QUALITATIVE: NEGATIVE
HCT VFR BLD CALC: 39.5 % (ref 36–46)
HEMOGLOBIN: 13.5 G/DL (ref 12–16)
IMMATURE GRANULOCYTES: ABNORMAL %
LIPASE: 27 U/L (ref 13–60)
LYMPHOCYTES # BLD: 13 % (ref 24–44)
MAGNESIUM: 2.1 MG/DL (ref 1.6–2.6)
MCH RBC QN AUTO: 33.2 PG (ref 26–34)
MCHC RBC AUTO-ENTMCNC: 34.2 G/DL (ref 31–37)
MCV RBC AUTO: 97.2 FL (ref 80–100)
MONOCYTES # BLD: 7 % (ref 2–11)
NRBC AUTOMATED: ABNORMAL PER 100 WBC
PDW BLD-RTO: 13.2 % (ref 12.5–15.4)
PLATELET # BLD: 248 K/UL (ref 140–450)
PLATELET ESTIMATE: ABNORMAL
PMV BLD AUTO: 8.6 FL (ref 6–12)
POTASSIUM SERPL-SCNC: 4.4 MMOL/L (ref 3.7–5.3)
RBC # BLD: 4.06 M/UL (ref 4–5.2)
RBC # BLD: ABNORMAL 10*6/UL
SEG NEUTROPHILS: 78 % (ref 36–66)
SEGMENTED NEUTROPHILS ABSOLUTE COUNT: 6 K/UL (ref 1.8–7.7)
SODIUM BLD-SCNC: 138 MMOL/L (ref 135–144)
TOTAL PROTEIN: 6.9 G/DL (ref 6.4–8.3)
WBC # BLD: 7.6 K/UL (ref 3.5–11)
WBC # BLD: ABNORMAL 10*3/UL

## 2021-01-30 PROCEDURE — 83690 ASSAY OF LIPASE: CPT

## 2021-01-30 PROCEDURE — 71045 X-RAY EXAM CHEST 1 VIEW: CPT

## 2021-01-30 PROCEDURE — 36415 COLL VENOUS BLD VENIPUNCTURE: CPT

## 2021-01-30 PROCEDURE — 83735 ASSAY OF MAGNESIUM: CPT

## 2021-01-30 PROCEDURE — 84703 CHORIONIC GONADOTROPIN ASSAY: CPT

## 2021-01-30 PROCEDURE — 99285 EMERGENCY DEPT VISIT HI MDM: CPT

## 2021-01-30 PROCEDURE — 80053 COMPREHEN METABOLIC PANEL: CPT

## 2021-01-30 PROCEDURE — 93005 ELECTROCARDIOGRAM TRACING: CPT | Performed by: EMERGENCY MEDICINE

## 2021-01-30 PROCEDURE — 85025 COMPLETE CBC W/AUTO DIFF WBC: CPT

## 2021-01-30 PROCEDURE — 70450 CT HEAD/BRAIN W/O DYE: CPT

## 2021-01-30 RX ORDER — 0.9 % SODIUM CHLORIDE 0.9 %
1000 INTRAVENOUS SOLUTION INTRAVENOUS ONCE
Status: DISCONTINUED | OUTPATIENT
Start: 2021-01-30 | End: 2021-01-30 | Stop reason: HOSPADM

## 2021-01-30 ASSESSMENT — ENCOUNTER SYMPTOMS
SORE THROAT: 0
DIARRHEA: 0
SHORTNESS OF BREATH: 0
VOMITING: 0
BACK PAIN: 0
EYE PAIN: 0
COUGH: 0
RHINORRHEA: 0
ABDOMINAL PAIN: 0
NAUSEA: 0

## 2021-01-30 ASSESSMENT — PAIN SCALES - GENERAL: PAINLEVEL_OUTOF10: 5

## 2021-01-31 NOTE — ED TRIAGE NOTES
pt was brought in by [facility] police and [facility] after being arrested for burglary she began to pass out x 4. Per EMS and plice, pt responded to stimuli through each episode.

## 2021-01-31 NOTE — ED NOTES
Pt begins to wake up.  Pt answering triage questions at this time     Pernell Foreman RN  01/30/21 [id number]

## 2021-01-31 NOTE — ED NOTES
Report received from [place] Boxer, [place]. Care assumed. Patient is alert and oriented x4. VSS. No distress noted. Pt refusing additional IV access.      Maryuri Briceño RN  01/30/21 1946

## 2021-01-31 NOTE — ED TRIAGE NOTES
Unable to complete triage in its entirety at this time. Pt not responding to questions. Vitals stable.
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
## 2021-01-31 NOTE — ED NOTES
Pt awake, cooperative. Conversing with [employer]. No distress noted.      Sunday Bradford RN  01/30/21 1956

## 2021-02-02 LAB
EKG ATRIAL RATE: 86 BPM
EKG P AXIS: 83 DEGREES
EKG P-R INTERVAL: 134 MS
EKG Q-T INTERVAL: 372 MS
EKG QRS DURATION: 80 MS
EKG QTC CALCULATION (BAZETT): 445 MS
EKG R AXIS: 69 DEGREES
EKG T AXIS: 66 DEGREES
EKG VENTRICULAR RATE: 86 BPM